# Patient Record
Sex: MALE | Race: WHITE | Employment: OTHER | URBAN - METROPOLITAN AREA
[De-identification: names, ages, dates, MRNs, and addresses within clinical notes are randomized per-mention and may not be internally consistent; named-entity substitution may affect disease eponyms.]

---

## 2023-02-08 ENCOUNTER — HOSPITAL ENCOUNTER (OUTPATIENT)
Dept: CARDIAC CATH/INVASIVE PROCEDURES | Age: 88
Discharge: HOME | End: 2023-02-08
Payer: MEDICARE

## 2023-02-08 VITALS
DIASTOLIC BLOOD PRESSURE: 51 MMHG | OXYGEN SATURATION: 94 % | RESPIRATION RATE: 16 BRPM | HEIGHT: 70 IN | TEMPERATURE: 98.2 F | SYSTOLIC BLOOD PRESSURE: 138 MMHG | HEART RATE: 61 BPM | WEIGHT: 152 LBS | BODY MASS INDEX: 21.76 KG/M2

## 2023-02-08 LAB
EGFR, POC: >60 ML/MIN/1.73M2
GLUCOSE BLD-MCNC: 115 MG/DL (ref 74–100)
PLATELET # BLD AUTO: 147 K/UL (ref 138–453)
POC BUN: 26 MG/DL (ref 8–26)
POC CHLORIDE: 113 MMOL/L (ref 98–107)
POC CREATININE: 1.15 MG/DL (ref 0.51–1.19)
POC HEMATOCRIT: 46 % (ref 41–53)
POC HEMOGLOBIN: 15.8 G/DL (ref 13.5–17.5)
POC POTASSIUM: 5.1 MMOL/L (ref 3.5–4.5)
POC SODIUM: 141 MMOL/L (ref 138–146)

## 2023-02-08 PROCEDURE — 85049 AUTOMATED PLATELET COUNT: CPT

## 2023-02-08 PROCEDURE — 2709999900 HC NON-CHARGEABLE SUPPLY

## 2023-02-08 PROCEDURE — 6360000004 HC RX CONTRAST MEDICATION

## 2023-02-08 PROCEDURE — 84295 ASSAY OF SERUM SODIUM: CPT

## 2023-02-08 PROCEDURE — 2580000003 HC RX 258: Performed by: INTERNAL MEDICINE

## 2023-02-08 PROCEDURE — 36415 COLL VENOUS BLD VENIPUNCTURE: CPT

## 2023-02-08 PROCEDURE — 7100000010 HC PHASE II RECOVERY - FIRST 15 MIN

## 2023-02-08 PROCEDURE — 82435 ASSAY OF BLOOD CHLORIDE: CPT

## 2023-02-08 PROCEDURE — 7100000011 HC PHASE II RECOVERY - ADDTL 15 MIN

## 2023-02-08 PROCEDURE — C1894 INTRO/SHEATH, NON-LASER: HCPCS

## 2023-02-08 PROCEDURE — 84520 ASSAY OF UREA NITROGEN: CPT

## 2023-02-08 PROCEDURE — 82565 ASSAY OF CREATININE: CPT

## 2023-02-08 PROCEDURE — 84132 ASSAY OF SERUM POTASSIUM: CPT

## 2023-02-08 PROCEDURE — 82947 ASSAY GLUCOSE BLOOD QUANT: CPT

## 2023-02-08 PROCEDURE — 85014 HEMATOCRIT: CPT

## 2023-02-08 PROCEDURE — 6360000002 HC RX W HCPCS

## 2023-02-08 RX ORDER — M-VIT,TX,IRON,MINS/CALC/FOLIC 27MG-0.4MG
1 TABLET ORAL DAILY
COMMUNITY

## 2023-02-08 RX ORDER — LISINOPRIL 20 MG/1
20 TABLET ORAL 2 TIMES DAILY
COMMUNITY

## 2023-02-08 RX ORDER — SODIUM CHLORIDE 9 MG/ML
INJECTION, SOLUTION INTRAVENOUS CONTINUOUS
Status: DISCONTINUED | OUTPATIENT
Start: 2023-02-08 | End: 2023-02-09 | Stop reason: HOSPADM

## 2023-02-08 RX ORDER — ATORVASTATIN CALCIUM 10 MG/1
10 TABLET, FILM COATED ORAL NIGHTLY
COMMUNITY

## 2023-02-08 RX ADMIN — SODIUM CHLORIDE: 9 INJECTION, SOLUTION INTRAVENOUS at 13:54

## 2023-02-08 NOTE — PROGRESS NOTES
Patient admitted, consent signed and questions answered. Patient ready for procedure. Call light to reach with side rails up 2 of 2. Bilat groins clipped with writer and Armida present. family at bedside with patient. History and physical completed.

## 2023-02-08 NOTE — PROGRESS NOTES
All discharge instructions reviewed, questions answered, paper signed and given copy. Patient discharged per RN Gianfranco Dobbs with family and belongings. No questions or concerns.

## 2023-02-08 NOTE — DISCHARGE INSTRUCTIONS
TRANSESOPHAGEAL ECHOCARDIOGRAM / BEBE DISCHARGE INSTRUCTIONS    If the following occurs call your physician or seek help    -trouble with swallowing    -spitting or coughing up blood    -severe pain in the throat region / your throat can be sore for several days but pain should not increase as days continue    Do not drive or operate heavy machinery today due to sedation            Transesophageal Echocardiogram:  What is a transesophageal echocardiogram?     A transesophageal echocardiogram is a test to help your doctor look at the inside of your heart. A small device called a transducer directs sound waves toward your heart. The sound waves make a picture of the heart's valves and chambers. Your doctor may do this test to look for certain types of heart disease. Or it may be done to see how disease is affecting your heart. You will be given medicine to make you sleepy and comfortable during the test.  The doctor puts a small, flexible tube into your throat and guides it to the esophagus. This is the tube that connects your mouth to your stomach. The doctor will ask you to swallow as the tube goes down. The transducer is at the tip of the tube. It gets close to your heart to make clear pictures. The doctor will look at the ultrasound pictures on a screen. You will not be able to eat or drink until the numbness from the throat spray wears off. Your throat may be sore for a few days after the test.  Follow-up care is a key part of your treatment and safety. Be sure to make and go to all appointments, and call your doctor if you are having problems. It's also a good idea to know your test results and keep a list of your current medications. Going home  Be sure you have someone to drive you home. Anesthesia and pain medicine make it unsafe for you to drive. Where can you learn more? Go to [email or web address] org and sign in to your [employer] account.  Enter K500 in the [address] Information box to learn more about Transesophageal Echocardiogram: Before Your Procedure.     If you do not have an account, please click on the Sign Up Now link. © 7851-2345 Healthwise, Incorporated. Care instructions adapted under license by [facility] ([facility]). This care instruction is for use with your licensed healthcare professional. If you have questions about a medical condition or this instruction, always ask your healthcare professional. [address] any warranty or liability for your use of this information.   Content Version: 00.1.380402; Current as of: February 20, 2015

## 2023-02-08 NOTE — PROGRESS NOTES
Taking fluids well with some soreness to throat. Ambulates with some vertigo. Back to bed with IV fluids continuing. Vitals stable.

## 2023-02-08 NOTE — PROGRESS NOTES
Patient had multiple attempts for BEBE, but was unable to tolerate procedure. Will need to postpone case until able to schedule under general anesthesia as per Dr Marion Mortimer.     Allison Tomlinson  [facility] Fellow

## 2023-02-08 NOTE — PROGRESS NOTES
Pt returned to room after BEBE procedure was unable to be completed d/t difficult anatomy and inability to pass the tube. VSS, Dr. Marcelo Alvarado in to talk to family. Pt to return to have procedures completed, office will call to schedule.

## 2023-02-08 NOTE — H&P
[facility] Cardiology Cardiology   History & Physical               Today's Date: 2/8/2023  Patient Name: Marko Preciado  Date of admission: No admission date for patient encounter. Patient's age: 81 y.o., 1935  Admission Dx: No admission diagnoses are documented for this encounter. Reason for Admission:  Valvular heart disease    CHIEF COMPLAINT:      History Obtained From:  patient, electronic medical record    HISTORY OF PRESENT ILLNESS:      The patient is a 81 y.o. presenting for scheduled elective coronary angiography to evaluate severity of mitral regurgitation and aortic valve disease. Patient underwent stress test which was noted to be abnormal.     Past Medical History:   has a past medical history of Aortic valve insufficiency, Arthritis, and HTN (hypertension). Past Surgical History:   has a past surgical history that includes Colonoscopy; back surgery; Aortic valve replacement (5/19/11); hernia repair; and Prostate surgery. Home Medications:    Prior to Admission medications    Medication Sig Start Date End Date Taking? Authorizing Provider   aspirin 81 MG tablet Take 81 mg by mouth daily. Historical Provider, MD   vitamin B-12 (CYANOCOBALAMIN) 1000 MCG tablet Take 2,000 mcg by mouth daily. Historical Provider, MD   Omega-3 Fatty Acids (FISH OIL) 1000 MG CAPS Take 1,000 mg by mouth daily. Historical Provider, MD   acetaminophen (TYLENOL) 325 MG tablet Take 650 mg by mouth every 6 hours as needed. Historical Provider, MD        No current facility-administered medications for this encounter. Allergies:  Codeine, Penicillins, and Sulfa antibiotics    Social History:   reports that he has never smoked. He does not have any smokeless tobacco history on file. Family History: family history includes Cancer in his sister; Diabetes in his sister; Heart Attack in his father; Hypertension in his mother.     REVIEW OF SYSTEMS:      Constitutional: there has been no unanticipated weight loss. Eyes: No visual changes or diplopia. ENT: No Headaches  Cardiovascular:  Remaining as above  Respiratory: No cough  Gastrointestinal: No abdominal pain. No change in bowel or bladder habits. Genitourinary: No dysuria, trouble voiding, or hematuria. Neurological: No headache. PHYSICAL EXAM:      There were no vitals taken for this visit. No intake or output data in the 24 hours ending 02/08/23 0906        Constitutional and General Appearance:   alert, cooperative, no distress and appears stated age  HEENT:  PERRL, EOMI  Respiratory:  Normal excursion and expansion without use of accessory muscles  Resp Auscultation:  Good respiratory effort. No for increased work of breathing. On auscultation: clear to auscultation bilaterally  Cardiovascular:  Regular rate and rhythm  S1/S2  + murmur  The apical impulse is not displaced  Abdomen:  Soft  Bowel sounds present  Non-tender to palpation  Extremities:  No cyanosis or clubbing  Lower extremity edema: No  Skin:  Warm and dry  Neurological:  Alert and oriented. Moves all extremities well    Labs:     CBC: No results for input(s): WBC, HGB, HCT, PLT in the last 72 hours. BMP: No results for input(s): NA, K, CO2, BUN, CREATININE, LABGLOM, GLUCOSE in the last 72 hours. Pro-BNP:  No results for input(s): PROBNP in the last 72 hours. BNP: No results for input(s): BNP in the last 72 hours. PT/INR: No results for input(s): PROTIME, INR in the last 72 hours. APTT:No results for input(s): APTT in the last 72 hours. CARDIAC ENZYMES:No results for input(s): CKTOTAL, CKMB, CKMBINDEX, TROPONINI in the last 72 hours. Invalid input(s):  TROPONINT  No results for input(s): TROPONINT in the last 72 hours. FASTING LIPID PANEL:No results found for: HDL, LDLDIRECT, LDLCALC, TRIG  LIVER PROFILE:No results for input(s): AST, ALT, LABALBU in the last 72 hours. Patient's Active Problem List  Active Problems:    * No active hospital problems. *  Resolved Problems:    * No resolved hospital problems. *      DATA  ECHO  9/2/22  Left Ventricle : The left ventricular systolic function is normal.      The left ventricular ejection fraction is within the normal range. LVEF is 66%. Aortic Valve : Bioprosthetic (Bovine) aortic valve is seen in good      position. Peak gradient 45mmHg. Mean gradient 23mmHg. There is mild      to moderate aortic regurgitation. Mild aortic stenosis. Mitral Valve : Mitral regurgitation is moderate to severe. Multiple      jets noted. EOA is 0.29cm2, MR radius is 0.8cm, MR volume is 53ml,      vena contracta is 0.73cm, mean gradient is 1mmHg. Pulmonic Valve : There is mild pulmonic valvular regurgitation. Holter  2/9/2021   This is a normal Holter recording for patient's age. Normal sinus rhythm is the predominant rhythm during the recording      period. No significant arrhythmias when patient reported symptoms. No significant arrhythmias noted. No pauses noted. No malignant arrhythmias       STRESS  04/2022   No ischemic ECG changes      Bradycardia with PVCs throughout test      Inferoapical infarct with tom-infarct ischemia       IMPRESSION:    Abnormal Stress test  Mitral regurge; Moderate to severe  Moderate AI    RECOMMENDATIONS:  Proceed with planned procedure  Further recommendations to follow      Discussed with patient and Nurse. Ally Morel MD, M.D. Fellow, [address]      Please note that part of this chart were generated using voice recognition  dictation software. Although every effort was made to ensure the accuracy of this automated transcription, some errors in transcription may have occurred.

## 2023-02-21 ENCOUNTER — ANESTHESIA EVENT (OUTPATIENT)
Dept: CARDIAC CATH/INVASIVE PROCEDURES | Age: 88
End: 2023-02-21

## 2023-02-21 ENCOUNTER — ANESTHESIA (OUTPATIENT)
Dept: CARDIAC CATH/INVASIVE PROCEDURES | Age: 88
End: 2023-02-21

## 2023-02-21 ENCOUNTER — HOSPITAL ENCOUNTER (INPATIENT)
Dept: CARDIAC CATH/INVASIVE PROCEDURES | Age: 88
LOS: 1 days | Discharge: HOME HEALTH | DRG: 247 | End: 2023-02-22
Attending: INTERNAL MEDICINE | Admitting: INTERNAL MEDICINE
Payer: MEDICARE

## 2023-02-21 PROBLEM — Z98.890 STATUS POST CARDIAC CATHETERIZATION: Status: ACTIVE | Noted: 2023-02-21

## 2023-02-21 PROBLEM — Z95.820 S/P ANGIOPLASTY WITH STENT: Status: ACTIVE | Noted: 2023-02-21

## 2023-02-21 LAB
ACTIVATED CLOTTING TIME: 224 SEC (ref 79–149)
EGFR, POC: >60 ML/MIN/1.73M2
GLUCOSE BLD-MCNC: 103 MG/DL (ref 74–100)
PLATELET # BLD AUTO: 181 K/UL (ref 138–453)
POC BUN: 23 MG/DL (ref 8–26)
POC CHLORIDE: 107 MMOL/L (ref 98–107)
POC CREATININE: 1.14 MG/DL (ref 0.51–1.19)
POC HEMATOCRIT: 47 % (ref 41–53)
POC HEMOGLOBIN: 15.9 G/DL (ref 13.5–17.5)
POC IONIZED CALCIUM: 1.27 MMOL/L (ref 1.15–1.33)
POC POTASSIUM: 4.8 MMOL/L (ref 3.5–4.5)
POC SODIUM: 145 MMOL/L (ref 138–146)

## 2023-02-21 PROCEDURE — 84132 ASSAY OF SERUM POTASSIUM: CPT

## 2023-02-21 PROCEDURE — 99152 MOD SED SAME PHYS/QHP 5/>YRS: CPT

## 2023-02-21 PROCEDURE — 4A023N8 MEASUREMENT OF CARDIAC SAMPLING AND PRESSURE, BILATERAL, PERCUTANEOUS APPROACH: ICD-10-PCS | Performed by: INTERNAL MEDICINE

## 2023-02-21 PROCEDURE — C1874 STENT, COATED/COV W/DEL SYS: HCPCS

## 2023-02-21 PROCEDURE — 85049 AUTOMATED PLATELET COUNT: CPT

## 2023-02-21 PROCEDURE — 2060000000 HC ICU INTERMEDIATE R&B

## 2023-02-21 PROCEDURE — B2151ZZ FLUOROSCOPY OF LEFT HEART USING LOW OSMOLAR CONTRAST: ICD-10-PCS | Performed by: INTERNAL MEDICINE

## 2023-02-21 PROCEDURE — 85347 COAGULATION TIME ACTIVATED: CPT

## 2023-02-21 PROCEDURE — 82947 ASSAY GLUCOSE BLOOD QUANT: CPT

## 2023-02-21 PROCEDURE — B24BZZ4 ULTRASONOGRAPHY OF HEART WITH AORTA, TRANSESOPHAGEAL: ICD-10-PCS | Performed by: INTERNAL MEDICINE

## 2023-02-21 PROCEDURE — C1892 INTRO/SHEATH,FIXED,PEEL-AWAY: HCPCS

## 2023-02-21 PROCEDURE — 6360000004 HC RX CONTRAST MEDICATION

## 2023-02-21 PROCEDURE — C1769 GUIDE WIRE: HCPCS

## 2023-02-21 PROCEDURE — 93460 R&L HRT ART/VENTRICLE ANGIO: CPT

## 2023-02-21 PROCEDURE — C1760 CLOSURE DEV, VASC: HCPCS

## 2023-02-21 PROCEDURE — 82565 ASSAY OF CREATININE: CPT

## 2023-02-21 PROCEDURE — 82330 ASSAY OF CALCIUM: CPT

## 2023-02-21 PROCEDURE — C1887 CATHETER, GUIDING: HCPCS

## 2023-02-21 PROCEDURE — 6370000000 HC RX 637 (ALT 250 FOR IP)

## 2023-02-21 PROCEDURE — 7100000011 HC PHASE II RECOVERY - ADDTL 15 MIN

## 2023-02-21 PROCEDURE — 6360000002 HC RX W HCPCS

## 2023-02-21 PROCEDURE — 2580000003 HC RX 258: Performed by: STUDENT IN AN ORGANIZED HEALTH CARE EDUCATION/TRAINING PROGRAM

## 2023-02-21 PROCEDURE — 6370000000 HC RX 637 (ALT 250 FOR IP): Performed by: STUDENT IN AN ORGANIZED HEALTH CARE EDUCATION/TRAINING PROGRAM

## 2023-02-21 PROCEDURE — B2111ZZ FLUOROSCOPY OF MULTIPLE CORONARY ARTERIES USING LOW OSMOLAR CONTRAST: ICD-10-PCS | Performed by: INTERNAL MEDICINE

## 2023-02-21 PROCEDURE — C1894 INTRO/SHEATH, NON-LASER: HCPCS

## 2023-02-21 PROCEDURE — 027034Z DILATION OF CORONARY ARTERY, ONE ARTERY WITH DRUG-ELUTING INTRALUMINAL DEVICE, PERCUTANEOUS APPROACH: ICD-10-PCS | Performed by: INTERNAL MEDICINE

## 2023-02-21 PROCEDURE — 99153 MOD SED SAME PHYS/QHP EA: CPT

## 2023-02-21 PROCEDURE — 84520 ASSAY OF UREA NITROGEN: CPT

## 2023-02-21 PROCEDURE — 7100000010 HC PHASE II RECOVERY - FIRST 15 MIN

## 2023-02-21 PROCEDURE — 85014 HEMATOCRIT: CPT

## 2023-02-21 PROCEDURE — 93325 DOPPLER ECHO COLOR FLOW MAPG: CPT

## 2023-02-21 PROCEDURE — C1751 CATH, INF, PER/CENT/MIDLINE: HCPCS

## 2023-02-21 PROCEDURE — 82435 ASSAY OF BLOOD CHLORIDE: CPT

## 2023-02-21 PROCEDURE — 2709999900 HC NON-CHARGEABLE SUPPLY

## 2023-02-21 PROCEDURE — C9600 PERC DRUG-EL COR STENT SING: HCPCS

## 2023-02-21 PROCEDURE — 93312 ECHO TRANSESOPHAGEAL: CPT

## 2023-02-21 PROCEDURE — 84295 ASSAY OF SERUM SODIUM: CPT

## 2023-02-21 RX ORDER — ATORVASTATIN CALCIUM 40 MG/1
40 TABLET, FILM COATED ORAL NIGHTLY
Status: DISCONTINUED | OUTPATIENT
Start: 2023-02-21 | End: 2023-02-22 | Stop reason: HOSPADM

## 2023-02-21 RX ORDER — CLOPIDOGREL BISULFATE 75 MG/1
75 TABLET ORAL DAILY
Qty: 30 TABLET | Refills: 3 | Status: SHIPPED | OUTPATIENT
Start: 2023-02-21

## 2023-02-21 RX ORDER — SODIUM CHLORIDE 0.9 % (FLUSH) 0.9 %
5-40 SYRINGE (ML) INJECTION PRN
Status: DISCONTINUED | OUTPATIENT
Start: 2023-02-21 | End: 2023-02-22 | Stop reason: HOSPADM

## 2023-02-21 RX ORDER — SODIUM CHLORIDE 0.9 % (FLUSH) 0.9 %
10 SYRINGE (ML) INJECTION EVERY 12 HOURS SCHEDULED
Status: DISCONTINUED | OUTPATIENT
Start: 2023-02-21 | End: 2023-02-22 | Stop reason: HOSPADM

## 2023-02-21 RX ORDER — POLYETHYLENE GLYCOL 3350 17 G/17G
17 POWDER, FOR SOLUTION ORAL DAILY PRN
Status: DISCONTINUED | OUTPATIENT
Start: 2023-02-21 | End: 2023-02-22 | Stop reason: HOSPADM

## 2023-02-21 RX ORDER — ACETAMINOPHEN 325 MG/1
650 TABLET ORAL EVERY 6 HOURS PRN
Status: DISCONTINUED | OUTPATIENT
Start: 2023-02-21 | End: 2023-02-22 | Stop reason: HOSPADM

## 2023-02-21 RX ORDER — ONDANSETRON 2 MG/ML
4 INJECTION INTRAMUSCULAR; INTRAVENOUS EVERY 6 HOURS PRN
Status: DISCONTINUED | OUTPATIENT
Start: 2023-02-21 | End: 2023-02-22 | Stop reason: HOSPADM

## 2023-02-21 RX ORDER — CLOPIDOGREL BISULFATE 75 MG/1
75 TABLET ORAL DAILY
Status: DISCONTINUED | OUTPATIENT
Start: 2023-02-21 | End: 2023-02-22 | Stop reason: HOSPADM

## 2023-02-21 RX ORDER — HYDRALAZINE HYDROCHLORIDE 20 MG/ML
10 INJECTION INTRAMUSCULAR; INTRAVENOUS EVERY 6 HOURS PRN
Status: DISCONTINUED | OUTPATIENT
Start: 2023-02-21 | End: 2023-02-22 | Stop reason: HOSPADM

## 2023-02-21 RX ORDER — SODIUM CHLORIDE 0.9 % (FLUSH) 0.9 %
10 SYRINGE (ML) INJECTION PRN
Status: DISCONTINUED | OUTPATIENT
Start: 2023-02-21 | End: 2023-02-22 | Stop reason: HOSPADM

## 2023-02-21 RX ORDER — SODIUM CHLORIDE 9 MG/ML
INJECTION, SOLUTION INTRAVENOUS PRN
Status: DISCONTINUED | OUTPATIENT
Start: 2023-02-21 | End: 2023-02-22 | Stop reason: HOSPADM

## 2023-02-21 RX ORDER — SODIUM CHLORIDE 9 MG/ML
INJECTION, SOLUTION INTRAVENOUS CONTINUOUS
Status: DISCONTINUED | OUTPATIENT
Start: 2023-02-21 | End: 2023-02-22 | Stop reason: HOSPADM

## 2023-02-21 RX ORDER — SODIUM CHLORIDE 0.9 % (FLUSH) 0.9 %
5-40 SYRINGE (ML) INJECTION EVERY 12 HOURS SCHEDULED
Status: DISCONTINUED | OUTPATIENT
Start: 2023-02-21 | End: 2023-02-22 | Stop reason: HOSPADM

## 2023-02-21 RX ORDER — ASPIRIN 81 MG/1
81 TABLET, CHEWABLE ORAL DAILY
Status: DISCONTINUED | OUTPATIENT
Start: 2023-02-21 | End: 2023-02-22 | Stop reason: HOSPADM

## 2023-02-21 RX ORDER — ONDANSETRON 4 MG/1
4 TABLET, ORALLY DISINTEGRATING ORAL EVERY 8 HOURS PRN
Status: DISCONTINUED | OUTPATIENT
Start: 2023-02-21 | End: 2023-02-22 | Stop reason: HOSPADM

## 2023-02-21 RX ORDER — ACETAMINOPHEN 650 MG/1
650 SUPPOSITORY RECTAL EVERY 6 HOURS PRN
Status: DISCONTINUED | OUTPATIENT
Start: 2023-02-21 | End: 2023-02-22 | Stop reason: HOSPADM

## 2023-02-21 RX ORDER — LISINOPRIL 20 MG/1
20 TABLET ORAL DAILY
Status: DISCONTINUED | OUTPATIENT
Start: 2023-02-21 | End: 2023-02-22 | Stop reason: HOSPADM

## 2023-02-21 RX ADMIN — SODIUM CHLORIDE: 9 INJECTION, SOLUTION INTRAVENOUS at 09:53

## 2023-02-21 RX ADMIN — SODIUM CHLORIDE, PRESERVATIVE FREE 10 ML: 5 INJECTION INTRAVENOUS at 21:14

## 2023-02-21 RX ADMIN — ATORVASTATIN CALCIUM 40 MG: 80 TABLET, FILM COATED ORAL at 21:13

## 2023-02-21 NOTE — PROGRESS NOTES
Patient admitted, consent signed and questions answered. Patient ready for procedure. Call light to reach with side rails up 2 of 2. Bilateral groin clipped with writer and Armida present. Family at bedside with patient. History and physical needs to be completed. Cathy Puga NSTEMI

## 2023-02-21 NOTE — ANESTHESIA PRE PROCEDURE
Department of Anesthesiology  Preprocedure Note       Name:  Baltazar Hernandez   Age:  81 y.o.  :  1935                                          MRN:  [id number]         Date:  2023      Surgeon: * Surgery not found *    Procedure:     Medications prior to admission:   Prior to Admission medications    Medication Sig Start Date End Date Taking? Authorizing Provider   lisinopril (PRINIVIL;ZESTRIL) 20 MG tablet Take 20 mg by mouth in the morning and at bedtime    Historical Provider, MD   atorvastatin (LIPITOR) 10 MG tablet Take 10 mg by mouth at bedtime    Historical Provider, MD   Cholecalciferol (D3 PO) Take 1 capsule by mouth daily    Historical Provider, MD   Multiple Vitamins-Minerals (THERAPEUTIC MULTIVITAMIN-MINERALS) tablet Take 1 tablet by mouth daily    Historical Provider, MD   aspirin 81 MG tablet Take 81 mg by mouth every evening    Historical Provider, MD   acetaminophen (TYLENOL) 325 MG tablet Take 650 mg by mouth every 6 hours as needed. Historical Provider, MD       Current medications:    Current Outpatient Medications   Medication Sig Dispense Refill    lisinopril (PRINIVIL;ZESTRIL) 20 MG tablet Take 20 mg by mouth in the morning and at bedtime      atorvastatin (LIPITOR) 10 MG tablet Take 10 mg by mouth at bedtime      Cholecalciferol (D3 PO) Take 1 capsule by mouth daily      Multiple Vitamins-Minerals (THERAPEUTIC MULTIVITAMIN-MINERALS) tablet Take 1 tablet by mouth daily      aspirin 81 MG tablet Take 81 mg by mouth every evening      acetaminophen (TYLENOL) 325 MG tablet Take 650 mg by mouth every 6 hours as needed. Current Facility-Administered Medications   Medication Dose Route Frequency Provider Last Rate Last Admin    0.9 % sodium chloride infusion   IntraVENous Continuous Compa Coles  mL/hr at 23 0953 New Bag at 23 0953       Allergies:     Allergies   Allergen Reactions    Codeine Hives    Penicillins Hives    Sulfa Antibiotics Hives Problem List:  There is no problem list on file for this patient. Past Medical History:        Diagnosis Date    Aortic stenosis     Aortic valve insufficiency 05/09/2011    aortic valve replacement    Arthritis     BPH (benign prostatic hyperplasia)     DDD (degenerative disc disease), cervical     Dyslipidemia     H/O: stroke 2011    Heart palpitations     HTN (hypertension)        Past Surgical History:        Procedure Laterality Date    AORTIC VALVE REPLACEMENT  05/19/2011    minimally invasive 27mm medtonic mosaic  /  ROBOTIC    BACK SURGERY      X3    CARDIAC CATHETERIZATION  2011    CARDIAC CATHETERIZATION  02/21/2023    CATARACT REMOVAL WITH IMPLANT      2008 AND 2011    CHOLECYSTECTOMY  2021    COLONOSCOPY      EYE SURGERY      HERNIA REPAIR      1990 / 1996 / 1999   [address] Left 07/29/2013    PROSTATE SURGERY      TRANSESOPHAGEAL ECHOCARDIOGRAM  02/08/2023    TRANSESOPHAGEAL ECHOCARDIOGRAM  02/21/2023       Social History:    Social History     Tobacco Use    Smoking status: Never    Smokeless tobacco: Not on file   Substance Use Topics    Alcohol use: Not Currently                                Counseling given: Not Answered      Vital Signs (Current):   Vitals:    02/21/23 0947 02/21/23 0954   BP: (!) 117/54    Pulse: 57 59   Resp: 16    Temp: 97.4 °F (36.3 °C)    TempSrc: Oral    SpO2: 97%    Weight: 155 lb (70.3 kg)    Height: 5' 10\" (1.778 m)                                               BP Readings from Last 3 Encounters:   02/21/23 (!) 117/54   02/08/23 (!) 138/51       NPO Status:                                                                                 BMI:   Wt Readings from Last 3 Encounters:   02/21/23 155 lb (70.3 kg)   02/08/23 152 lb (68.9 kg)     Body mass index is 22.24 kg/m².     CBC:   Lab Results   Component Value Date/Time     02/21/2023 09:45 AM       CMP:   Lab Results   Component Value Date/Time    CREATININE 1.14 02/21/2023 09:42 AM       POC Tests:   Recent Labs     02/21/23  0942   POCGLU 103*   POCNA 145   POCK 4.8*   POCCL 107   POCBUN 23   POCHEMO 15.9   POCHCT 47       Coags: No results found for: PROTIME, INR, APTT    HCG (If Applicable): No results found for: PREGTESTUR, PREGSERUM, HCG, HCGQUANT     ABGs: No results found for: PHART, PO2ART, CSU0NJR, YHX6GOM, BEART, C2IFMNHB     Type & Screen (If Applicable):  No results found for: LABABO, LABRH    Drug/Infectious Status (If Applicable):  No results found for: HIV, HEPCAB    COVID-19 Screening (If Applicable): No results found for: COVID19    TTE 9/2022  Left Ventricle : The left ventricular systolic function is normal.      The left ventricular ejection fraction is within the normal range.      LVEF is 66%.      Aortic Valve : Bioprosthetic (Bovine) aortic valve is seen in good      position. Peak gradient 45mmHg. Mean gradient 23mmHg.  There is mild      to moderate aortic regurgitation.  Mild aortic stenosis.      Mitral Valve : Mitral regurgitation is moderate to severe. Multiple      jets noted. EOA is 0.29cm2, MR radius is 0.8cm, MR volume is 53ml,      vena contracta is 0.73cm, mean gradient is 1mmHg.      Pulmonic Valve : There is mild pulmonic valvular regurgitation. EKG 2/09/2023   SINUS RHYTHM WITH MARKED SINUS ARRHYTHMIA      BORDERLINE ECG      COMPARED TO PREVIOUS TRACING 02/13/2022       Anesthesia Evaluation    Airway: Mallampati: II          Dental:    (+) other      Pulmonary:Negative Pulmonary ROS and normal exam                               Cardiovascular:    (+) hypertension:, valvular problems/murmurs: AS,                   Neuro/Psych:   (+) CVA:,             GI/Hepatic/Renal:             Endo/Other:    (+) : arthritis:., .                 Abdominal:             Vascular: Other Findings:           Anesthesia Plan      MAC     ASA 3       Induction: intravenous. MIPS: Postoperative opioids intended.   Anesthetic plan and risks discussed with patient. Plan discussed with CRNA.                     Susan Cortez MD   2/21/2023

## 2023-02-21 NOTE — OP NOTE
[facility]  Transesophageal Echocardiogram       Today's Date:  2/21/2023  Ordering Physician:  Dr Magdalena Rossi  Indication:   MR and AI severity    Operators:  Primary:   Dr Magdalena Rossi (Attending Physician)    Pre Procedure Conscious Sedation Data:    ASA Class:    [] I [x] II [] III [] IV    Mallampati Class:  [] I [x] II [] III [] IV    Procedure:    Patient seen and examined. History and Physical reviewed. Labs reviewed. After informed consent was obtained with explanation of the risks and benefits, the patient was brought to cardiac cath lab. All sedation was administered by the cardiologist. The oropharynx was pre-anesthesized with viscous lidocaine and cetacaine spray. The ultrasound probe was passed without any difficulty. BEBE findings:    LA:  Normal  LOGAN:  No thrombus  RA:  Normal  RV:   Normal  LV:  Normal  Estimated LVEF:  55%  Aorta:   Mild atheromatous disease arch  Pericardium: No pericardial effusion  Septum:  No intracardiac shunt via color Doppler. Valves:    Mitral Valve: Structurally normal. Moderate to severe regurgitation is identified. Aortic Valve: The aortic valve is trileaflet and sclerotic with aortic sclerosis. BERNARD by planimetry 0.94 cm2. Mild regurgitiation is identified. Tricuspid valve: Structurally normal. Trivial regurgitation is identified. Pulmonary valve: Normal. No significant regurgitation    No valvular vegetations or thrombus identified. Summary:     1. A BEBE was performed without complications. 2. LVEF 55%. 3. The aortic valve is tri-leaflet and sclerotic with aortic sclerosis. BERNARD by planimetry 0.94 cm2. Mild AI. 4. Moderate to severe MR.  5. No thrombus or valvular vegetation identified  6. There were no complications encountered. 7. Proceed with right and left sided cardiac catheterization.      French Amin MD       Cardiovascular Fellow

## 2023-02-21 NOTE — OP NOTE
[facility]    CARDIAC CATHETERIZATION    Date:   2/21/2023  Patient name:  Thomas Colin  Date of admission:  2/21/2023  9:12 AM  MRN:   [id number]  YOB: 1935    Operators:  Primary:   Loi Morgan MD (Attending Physician)    Assistant/CV fellow:  Salinas Olivera MD      Procedure performed:     [x] Left Heart Catheterization. [] Graft Angiography. [x] Left Ventriculography. [x] Right Heart Catheterization. [x] Coronary Angiography. [] Aortic Valve Studies. [] PCI:      [] Other:       Pre Procedure Conscious Sedation Data:  ASA Class:    [] I [x] II [] III [] IV    Mallampati Class:  [] I [x] II [] III [] IV      Indication:  [x] Valvular Heart disease      [] + Stress test  [] ACS      [] + EKG Changes  [] Non Q MI       [] Significant Risk Factors  [] Recurrent Angina             [] Diabetes Mellitus    [] New LBBB      [] Uncontrolled HTN. [] CHF / Low EF changes     [] Abnormal CTA / Ca Score      Procedure:  Access:  [x] Femoral Artery and Vein [] Radial  artery       [x] Right  [] Left    Procedure: After informed consent was obtained with explanation of the risks and benefits, patient was brought to the cath lab. The access area was prepped and draped in sterile fashion. 1% lidocaine was used for local block. The artery was cannulated with 6  Fr sheath with brisk arterial blood return. The side port was frequently flushed and aspirated with normal saline. Findings:  LMCA: Normal     LAD: Mid 80% stenosis. The vessel is small distally   LIMA was checked and appeared not going anywhere     LCx: Large with no disease     RCA: Mid 80% stenosis       Lesion on Mid RCA:         Devices used         - Luge Wire 182 cm. Number of passes: 1.         - Geremias Florence 3.5 x 12mm. 1 inflation(s) to a max pressure of: 12     desiree. - Geremias Florence 3.5 x 12mm. 1 inflation(s) to a max pressure of: 12     desiree.         Coronary Tree        Dominance: Left       LV Analysis   LV function assessed as:Normal.   Ejection Fraction   +----------------------------------------------------------------------+---+   ! Method                                                                ! EF%! +----------------------------------------------------------------------+---+   ! LV gram                                                               !50 !   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 Recommendations:    Post stent protocol of the RCA   Monitor LAD disease with stress test and angina symptoms         RIGHT HEART CATHETERIZATION HEMODYNAMIC MEASUREMENTS      Procedure: After informed consent was obtained with explanation of the risks and benefits, the patient was brought to the cath lab. The right groin was prepped and draped in sterile fashion. 1% lidocaine was used for local block. Under ultrasound guidance, the right femoral vein was cannulated with 7  Fr sheath that was exchanged over a guidewire with non-pulsatile dark venous blood return noted. The side port was frequently flushed and aspirated with normal saline. Measurements were taken at the patient's phlebostatic axis.     Hemodynamic Pressures     Site S/A (mmHg) D/V (mmHg) M/ED (mmHg)   Right Atrium 9 8 6   Right Ventricle 64 0 30   Pulmonary Artery 43 0 19   PCWP 18 17 11         Oxygen Saturations    Site Oxygen Saturation (%)   Right Atrium 67.6   Pulmonary Artery Main 92.2        Cardiac Output    Calculation Method Cardiac Output (L/min) Cardiac Index (L/min/m2)   Lokesh 3. 88 2.1          Estimated Blood Loss:            [x] Minimal 10-25 cc   [] Minimal < 25 cc      [] Moderate 25-50 cc         [] >50 cc        ____________________________________________________________________    History and Risk Factors    [x] Hypertension     [] Family history of CAD  [x] Hyperlipidemia     [] Cerebrovascular Disease   [] Prior MI       [] Peripheral Vascular disease   [] Prior PCI              [] Diabetes Mellitus    [] Left Main PCI. [] Currently on Dialysis. [] Prior CABG. [] Currently smoker. [] Cardiac Arrest outside of healthcare facility. [] Yes    [x] No        Witnessed     [] Yes   [] No     Arrest after arrival of EMS  [] Yes   [] No     [] Cardiac Arrest at other Facility. [] Yes   [] No    Pre-Procedure Information. Heart Failure       [] Yes    [x] No        Class  [] I      [] II  [] III    [] IV. New Diagnosis    [] Yes  [] No    HF Type      [] Systolic   [] Diastolic          [] Unknown. Diagnostic Test:   EKG       [] Normal   [] Abnormal    New antiarrhythmia medications:    [] Yes   [] No   New onset atrial fibrillation / Flutter     [] Yes   [] No   ECG Abnormalities:      [] V. Fib   [] Rosie V. Tach           [] NS V. T   [] New LBBB           [] T. Inv  []  ST dev > 0.5 mm         [] PVC's freq  [] PVC's infrequent    Stress Test Performed:      [] Yes    [x] No     Type:     [] Stress Echo   [] Exercise Stress Test (no imaging)      [] Stress Nuclear  [] Stress Imaging     Results   [] Negative   [] Positive        [] Indeterminate  [] Unavailable     If Positive/ Risk / Extent of Ischemia:       [] Low  [] Intermediate         [] High  [] Unavailable      Cardiac CTA Performed:     [] Yes    [x] No      Results   [] CAD   [] Non obstructive CAD      [] No CAD   [] Uncertain      [] Unknown   [] Structural Disease.      Pre Procedure Medications:   [x] Yes    [] No         [x] ASA   [] Beta Blockers      [] Nitrate   [] Ca Channel Blockers      [] Ranolazine   [x] Statin       [] Plavix/Others antiplatelets      Allyson David MD       Cardiovascular Fellow

## 2023-02-22 VITALS
OXYGEN SATURATION: 90 % | BODY MASS INDEX: 22.19 KG/M2 | TEMPERATURE: 98.1 F | HEIGHT: 70 IN | DIASTOLIC BLOOD PRESSURE: 67 MMHG | SYSTOLIC BLOOD PRESSURE: 137 MMHG | HEART RATE: 79 BPM | RESPIRATION RATE: 18 BRPM | WEIGHT: 155 LBS

## 2023-02-22 LAB
ABSOLUTE EOS #: 0.22 K/UL (ref 0–0.44)
ABSOLUTE IMMATURE GRANULOCYTE: 0.03 K/UL (ref 0–0.3)
ABSOLUTE LYMPH #: 0.94 K/UL (ref 1.1–3.7)
ABSOLUTE MONO #: 0.85 K/UL (ref 0.1–1.2)
ANION GAP SERPL CALCULATED.3IONS-SCNC: 11 MMOL/L (ref 9–17)
BASOPHILS # BLD: 1 % (ref 0–2)
BASOPHILS ABSOLUTE: 0.05 K/UL (ref 0–0.2)
BUN SERPL-MCNC: 19 MG/DL (ref 8–23)
CALCIUM SERPL-MCNC: 9 MG/DL (ref 8.6–10.4)
CHLORIDE SERPL-SCNC: 106 MMOL/L (ref 98–107)
CO2 SERPL-SCNC: 22 MMOL/L (ref 20–31)
CREAT SERPL-MCNC: 1.18 MG/DL (ref 0.7–1.2)
EOSINOPHILS RELATIVE PERCENT: 2 % (ref 1–4)
GFR SERPL CREATININE-BSD FRML MDRD: >60 ML/MIN/1.73M2
GLUCOSE SERPL-MCNC: 116 MG/DL (ref 70–99)
HCT VFR BLD AUTO: 41.1 % (ref 40.7–50.3)
HGB BLD-MCNC: 13.6 G/DL (ref 13–17)
IMMATURE GRANULOCYTES: 0 %
LYMPHOCYTES # BLD: 10 % (ref 24–43)
MCH RBC QN AUTO: 33.7 PG (ref 25.2–33.5)
MCHC RBC AUTO-ENTMCNC: 33.1 G/DL (ref 28.4–34.8)
MCV RBC AUTO: 102 FL (ref 82.6–102.9)
MONOCYTES # BLD: 9 % (ref 3–12)
NRBC AUTOMATED: 0 PER 100 WBC
PDW BLD-RTO: 13.1 % (ref 11.8–14.4)
PLATELET # BLD AUTO: 167 K/UL (ref 138–453)
PMV BLD AUTO: 10.1 FL (ref 8.1–13.5)
POTASSIUM SERPL-SCNC: 5.4 MMOL/L (ref 3.7–5.3)
RBC # BLD: 4.03 M/UL (ref 4.21–5.77)
SEG NEUTROPHILS: 78 % (ref 36–65)
SEGMENTED NEUTROPHILS ABSOLUTE COUNT: 7.57 K/UL (ref 1.5–8.1)
SODIUM SERPL-SCNC: 139 MMOL/L (ref 135–144)
WBC # BLD AUTO: 9.7 K/UL (ref 3.5–11.3)

## 2023-02-22 PROCEDURE — 97116 GAIT TRAINING THERAPY: CPT

## 2023-02-22 PROCEDURE — 6370000000 HC RX 637 (ALT 250 FOR IP): Performed by: STUDENT IN AN ORGANIZED HEALTH CARE EDUCATION/TRAINING PROGRAM

## 2023-02-22 PROCEDURE — 97162 PT EVAL MOD COMPLEX 30 MIN: CPT

## 2023-02-22 PROCEDURE — 80048 BASIC METABOLIC PNL TOTAL CA: CPT

## 2023-02-22 PROCEDURE — 2580000003 HC RX 258: Performed by: STUDENT IN AN ORGANIZED HEALTH CARE EDUCATION/TRAINING PROGRAM

## 2023-02-22 PROCEDURE — 36415 COLL VENOUS BLD VENIPUNCTURE: CPT

## 2023-02-22 PROCEDURE — 97530 THERAPEUTIC ACTIVITIES: CPT

## 2023-02-22 PROCEDURE — 85025 COMPLETE CBC W/AUTO DIFF WBC: CPT

## 2023-02-22 RX ORDER — NITROGLYCERIN 0.4 MG/1
0.4 TABLET SUBLINGUAL EVERY 5 MIN PRN
Qty: 25 TABLET | Refills: 3 | Status: SHIPPED | OUTPATIENT
Start: 2023-02-22

## 2023-02-22 RX ORDER — ATORVASTATIN CALCIUM 40 MG/1
40 TABLET, FILM COATED ORAL NIGHTLY
Qty: 30 TABLET | Refills: 3 | Status: SHIPPED | OUTPATIENT
Start: 2023-02-22

## 2023-02-22 RX ADMIN — LISINOPRIL 20 MG: 20 TABLET ORAL at 08:41

## 2023-02-22 RX ADMIN — SODIUM CHLORIDE, PRESERVATIVE FREE 10 ML: 5 INJECTION INTRAVENOUS at 08:42

## 2023-02-22 RX ADMIN — ASPIRIN 81 MG: 81 TABLET, CHEWABLE ORAL at 08:41

## 2023-02-22 RX ADMIN — CLOPIDOGREL 75 MG: 75 TABLET, FILM COATED ORAL at 08:41

## 2023-02-22 NOTE — PROGRESS NOTES
Physical Therapy  Facility/Department: [facility] CAR 2- STEPDOWN  Physical Therapy Initial Assessment    Name: Maldonado Orr  : 1935  MRN: [id number]  Date of Service: 2023    The patient was admitted for: elective coronary angiography to evaluate severity of mitral regurgitation and aortic valve disease  Hospital Procedures: cardiac catheterization/BEBE    Discharge Recommendations:  Patient would benefit from continued therapy after discharge   PT Equipment Recommendations  Equipment Needed: Yes  Mobility Devices: Marcus Leaver: Rolling  Other: pt is a fall risk with recent inactivity and noted decline in high level balance      Patient Diagnosis(es): There were no encounter diagnoses. Past Medical History:  has a past medical history of Aortic stenosis, Aortic valve insufficiency, Arthritis, BPH (benign prostatic hyperplasia), DDD (degenerative disc disease), cervical, Dyslipidemia, H/O: stroke, Heart palpitations, and HTN (hypertension). Past Surgical History:  has a past surgical history that includes Colonoscopy; back surgery; Aortic valve replacement (2011); hernia repair; Prostate surgery; Cataract removal with implant; joint replacement (Left, 2013); Cholecystectomy (); Cardiac catheterization (); transesophageal echocardiogram (2023); eye surgery; Cardiac catheterization (2023); and transesophageal echocardiogram (2023). Assessment   Body Structures, Functions, Activity Limitations Requiring Skilled Therapeutic Intervention: Decreased functional mobility ; Decreased balance  Assessment: Pt presents with generalized weakness and deconditioning with recent inactivity. Pt require CGA with ambulating  for safet as pt states he feel unsteady upon initial standing from sitting. Pt educated and introduced to rolling walker for stability and safety with gait.  Pt will benefit from continued PT to progress activity and promote functional independence with gait needed to regain prior level of independence  Therapy Prognosis: Good  Decision Making: Medium Complexity  Clinical Presentation: evolving  Requires PT Follow-Up: Yes  Activity Tolerance  Activity Tolerance: Patient tolerated evaluation without incident;Patient limited by endurance; Patient limited by fatigue     Plan   Physcial Therapy Plan  General Plan:  (5-6x/wk)  Current Treatment Recommendations: Strengthening, Balance training, Functional mobility training, Transfer training, Gait training, Stair training, Equipment evaluation, education, & procurement, Therapeutic activities, Neuromuscular re-education  Safety Devices  Type of Devices: Call light within reach, Left in chair, Gait belt, Chair alarm in place, Nurse notified  Restraints  Restraints Initially in Place: No     Restrictions  Restrictions/Precautions  Restrictions/Precautions: Up as Tolerated  Required Braces or Orthoses?: No  Position Activity Restriction  Other position/activity restrictions: up with assist     Subjective   General  Chart Reviewed: Yes  Patient assessed for rehabilitation services?: Yes  Additional Pertinent Hx: A BEBE was performed without complications. LVEF 55%. ,aortic valve is tri-leaflet and sclerotic with aortic sclerosis,Moderate to severe MR.   Response To Previous Treatment: Not applicable  Family / Caregiver Present: No  Follows Commands: Within Functional Limits  Other (Comment): Pt awake and alert in agreement with PT intervention, Okay per RN to see  General Comment  Comments: Pt report indepedent at base line without AD  Subjective  Subjective: Pt report generalized overall pain, chronic in nature at dolly and LE's, able to continue to PT assessment         Social/Functional History  Social/Functional History  Lives With: Spouse  Type of Home: House  Home Layout: Two level  Home Access: Stairs to enter with rails  Entrance Stairs - Number of Steps: 3 [place]  Entrance Stairs - Rails: None  Bathroom Shower/Tub: Tub/Shower unit  Bathroom Toilet: Standard  Bathroom Equipment: Grab bars in shower  Bathroom Accessibility: Accessible  Has the patient had two or more falls in the past year or any fall with injury in the past year?: No  Receives Help From: Family  ADL Assistance: Independent  Homemaking Assistance: Independent  Homemaking Responsibilities: Yes  Meal Prep Responsibility: Secondary  Laundry Responsibility: Secondary  Cleaning Responsibility: Secondary  Bill Paying/Finance Responsibility: Primary  Shopping Responsibility: Primary  Dependent Care Responsibility: No  Health Care Management: Secondary  Other (Comment): Pt report house hold task are shared and pt is independent with self care  Ambulation Assistance: Independent  Transfer Assistance: Independent  Active : Yes  Mode of Transportation: Car  Occupation: Retired  Type of Occupation: farmer, occasionally work on farm  Leisure & Hobbies: working on farm  Additional Comments: Pt report independent at baseline without walker  [address]: Impaired  Vision Exceptions: Wears glasses for distance  Tracking: Able to track stimulus in all quads w/o difficulty  Hearing  Hearing: Exceptions to [facility]  Hearing Exceptions: No hearing aid (need increased volume)    Cognition   Orientation  Overall Orientation Status: Within Normal Limits  Orientation Level: Oriented X4  Cognition  Overall Cognitive Status: Exceptions  Arousal/Alertness: Delayed responses to stimuli  Following Commands: Follows all commands without difficulty  Attention Span: Attends with cues to redirect  Memory: Appears intact  Safety Judgement: Good awareness of safety precautions  Problem Solving: Able to problem solve independently  Insights: Fully aware of deficits  Initiation: Requires cues for some  Sequencing: Requires cues for some  Cognition Comment: Pleasemt and cooperative able to make needs known, demonstrates understanding of fall risk.  Pt in agreement with POC     Objective Heart Rate: 79  Heart Rate Source: Monitor  BP: 137/67  BP Location: Left upper arm  BP Method: Automatic  MAP (Calculated): 90  Resp: 18  SpO2: 90 %  O2 Device: None (Room air)     Observation/Palpation  Posture: Good  Observation: Pt able to sit/stand with erect posture  Gross Assessment  AROM: Within functional limits  PROM: Within functional limits  Strength: Within functional limits  Coordination: Within functional limits  Tone: Normal  Sensation: Intact     AROM RLE (degrees)  RLE AROM: WFL  AROM LLE (degrees)  LLE AROM : WFL  AROM RUE (degrees)  RUE AROM : WFL  AROM LUE (degrees)  LUE AROM : WFL  Strength RLE  Strength RLE: WFL  Strength LLE  Strength LLE: WFL  Strength RUE  Strength RUE: WFL  Strength LUE  Strength LUE: WFL  Strength Other  Other: Pt presents with general weakness and deconditioning           Bed mobility  Bridging: Supervision  Rolling to Left: Supervision  Rolling to Right: Supervision  Supine to Sit: Supervision  Sit to Supine: Supervision  Scooting: Supervision  Bed Mobility Comments: activity require extra time and effort with external support and use of railing  Transfers  Sit to Stand: Contact guard assistance  Stand to Sit: Contact guard assistance  Bed to Chair: Contact guard assistance  Comment: CGA for safety needed as pt states he feel unsteady. Pt educated and introduced to rolling walker for stability and safety with gait.   Ambulation  Surface: Level tile  Device: Rolling Walker  Assistance: Contact guard assistance  Quality of Gait: slow guarded gait with unsteadiness and pt seeking external support without AD, improved ability with walker  Gait Deviations: Slow Diane;Decreased step length  Distance: 60 ft CGA RW  Comments: Pt demonstrates fall risk with recommendation to use walker with gait and transfers pending improved general strength and activity  More Ambulation?: No  Stairs/Curb  Stairs?: No     Balance  Posture: Good  Sitting - Static: Good  Sitting - Dynamic: Good  Standing - Static: Good  Standing - Dynamic: Fair;+  Comments: standing dynamic balance assessed with rolling walker in place. Functional Reach Test  Fall Risk (Score of <10 inches is fall risk): Yes  Exercise Treatment: Pt sitting EOB x 10 min, SBA, STS CGA. Pt educated on fall risk, safety with use of walker provided practical instruction for use                                                        AM-PAC Score  AM-PAC Inpatient Mobility Raw Score : 18 (02/22/23 1252)  AM-PAC Inpatient T-Scale Score : 43.63 (02/22/23 1252)  Mobility Inpatient CMS 0-100% Score: 46.58 (02/22/23 1252)  Mobility Inpatient CMS G-Code Modifier : CK (02/22/23 1252)            Goals  Short Term Goals  Time Frame for Short Term Goals: 10 visits  Short Term Goal 1: Pt to ambulate 100 ft mod I with least AD  Short Term Goal 2: pt to demo independent bed mobility  Short Term Goal 3: Pt to ascend/descend 4 steps with right railing mod I  Patient Goals   Patient Goals : to return home       Education  Patient Education  Education Given To: Patient  Education Provided: Plan of Care;Role of Therapy;Transfer Training; Fall Prevention Strategies; Equipment  Education Provided Comments: Pt educated on fall risk, safety with use of walker provided practical instruction for use  Education Method: Demonstration  Barriers to Learning: None  Education Outcome: Verbalized understanding      Therapy Time   Individual Concurrent Group Co-treatment   Time In 0955         Time Out 1040         Minutes 45         Timed Code Treatment Minutes: 69 Guadalupe Elias, PT, DPT

## 2023-02-22 NOTE — DISCHARGE SUMMARY
[facility]  Discharge Note                 Name:  Lily Damian  YOB: 1935  Social Security Number:  [id number]  Medical Record Number:  [id number]    Date of Admission:  2/21/2023  Date of Discharge:  2/22/2023    Admitting physician: Mario Frey MD    Discharge Attending: JUANY Hayes NP, CNP  Primary Care Physician: Liza Hunt MD  Consultants: None   Discharge to home in stable condition     HOSPITAL ADMISSION PROBLEM LIST:  Patient Active Problem List   Diagnosis    S/P angioplasty with stent    Status post cardiac catheterization         Procedures:cardiac catheterization/BEBE    HOSPITAL COURSE :           The patient was admitted for: elective coronary angiography to evaluate severity of mitral regurgitation and aortic valve disease  Hospital Procedures if any: cardiac catheterization/BEBE        BEBE findings:     LA:       Normal  LOGAN:    No thrombus  RA:      Normal  RV:      Normal  LV:       Normal  Estimated LVEF:         55%  Aorta:               Mild atheromatous disease arch  Pericardium:    No pericardial effusion  Septum:           No intracardiac shunt via color Doppler. Valves:     Mitral Valve: Structurally normal. Moderate to severe regurgitation is identified. Aortic Valve: The aortic valve is trileaflet and sclerotic with aortic sclerosis. BERNARD by planimetry 0.94 cm2. Mild regurgitiation is identified. Tricuspid valve: Structurally normal. Trivial regurgitation is identified. Pulmonary valve: Normal. No significant regurgitation     No valvular vegetations or thrombus identified. Summary:      1. A BEBE was performed without complications. 2. LVEF 55%. 3. The aortic valve is tri-leaflet and sclerotic with aortic sclerosis. BERNARD by planimetry 0.94 cm2. Mild AI. 4. Moderate to severe MR.  5. No thrombus or valvular vegetation identified  6. There were no complications encountered.   7. Proceed with right and left sided cardiac catheterization. Kiera Pettit MD       Cardiovascular Fellow     CATH Findings:  LMCA: Normal     LAD: Mid 80% stenosis. The vessel is small distally   LIMA was checked and appeared not going anywhere     LCx: Large with no disease     RCA: Mid 80% stenosis       Lesion on Mid RCA:         Devices used         - Luge Wire 182 cm. Number of passes: 1.         - Geremias [place] 3.5 x 12mm. 1 inflation(s) to a max pressure of: 12     desiree. - [place] 3.5 x 12mm. 1 inflation(s) to a max pressure of: 12     desiree. Coronary Tree        Dominance: Left       LV Analysis   LV function assessed as:Normal.   Ejection Fraction   +----------------------------------------------------------------------+---+   ! Method                                                                ! EF%! +----------------------------------------------------------------------+---+   ! LV gram                                                               !50 !   +----------------------------------------------------------------------+---+      Impression:  Mid RCA stenosis 85%   Successful PTCA -CORNELIUS mid RCA 9Because of abnormal stress test inferior apical area)   Mid LAD stenosis, small size vessel (To be treated if continue angina or Sx)   Elevated right heart pressure   Aortic valve function good with mean gradient 11 mm Hg   BEBE confirmed moderate to severe mitral regurgitation, moderate aortic insufficiency, with LV function mildly reduced. Recommendations:    Post stent protocol of the RCA   Monitor LAD disease with stress test and angina symptoms          RIGHT HEART CATHETERIZATION HEMODYNAMIC MEASUREMENTS        Procedure: After informed consent was obtained with explanation of the risks and benefits, the patient was brought to the cath lab. The right groin was prepped and aped in sterile fashion. 1% lidocaine was used for local block.  Under ultrasound guidance, the right femoral vein was cannulated with 7  Fr sheath that was exchanged over a guidewire with non-pulsatile dark venous blood return noted. The side port was frequently flushed and aspirated with normal saline. Measurements were taken at the patient's phlebostatic axis. Hemodynamic Pressures                Site S/A (mmHg) D/V (mmHg) M/ED (mmHg)   Right Atrium 9 8 6   Right Ventricle 64 0 30   Pulmonary Artery 43 0 19   PCWP 18 17 11            Oxygen Saturations     Site Oxygen Saturation (%)   Right Atrium 67.6   Pulmonary Artery Main 92.2         Cardiac Output     Calculation Method Cardiac Output (L/min) Cardiac Index (L/min/m2)   Lokesh 3.88 2.1             Estimated Blood Loss:            [x] Minimal 10-25 cc   [] Minimal < 25 cc      [] Moderate 25-50 cc         [] >50 cc           ____________________________________________________________________     History and Risk Factors    [x] Hypertension                                                         [] Family history of CAD  [x] Hyperlipidemia                                                       [] Cerebrovascular Disease    [] Prior MI                                                                   [] Peripheral Vascular disease   [] Prior PCI                                                                [] Diabetes Mellitus     [] Left Main PCI. [] Currently on Dialysis. [] Prior CABG. [] Currently smoker. [] Cardiac Arrest outside of healthcare facility. [] Yes              [x] No                                           Witnessed                                [] Yes              [] No                                      Arrest after arrival of EMS      [] Yes              [] No        [] Cardiac Arrest at other Facility. [] Yes              [] No     Pre-Procedure Information.   Heart Failure [] Yes              [x] No                                                    Class               [] I          [] II              [] III                 [] IV. New Diagnosis                                    [] Yes             [] No                          HF Type                                              [] Systolic        [] Diastolic                                                                                           [] Unknown. Diagnostic Test:              EKG                                                                  [] Normal        [] Abnormal               New antiarrhythmia medications:                    [] Yes              [] No              New onset atrial fibrillation / Flutter                  [] Yes              [] No              ECG Abnormalities:                                         [] V. Fib           [] Rosie V. Tach                                                                                        [] NS V. T       [] New LBBB                                                                                        [] T.  Inv           []  ST dev > 0.5 mm                                                                                      [] PVC's freq   [] PVC's infrequent     Stress Test Performed:                                            [] Yes              [x] No       Type:                                        [] Stress Echo            [] Exercise Stress Test (no imaging)                                                  [] Stress Nuclear        [] Stress Imaging                Results                                    [] Negative                  [] Positive                                                    [] Indeterminate         [] Unavailable                 If Positive/ Risk / Extent of Ischemia:                                                  [] Low  [] Intermediate                                                             [] High            [] Unavailable                 Cardiac CTA Performed:                                          [] Yes              [x] No                   Results                         [] CAD            [] Non obstructive CAD                                                  [] No CAD       [] Uncertain                                                  [] Unknown     [] Structural Disease. Pre Procedure Medications:                                    [x] Yes              [] No                                                       [x] ASA                                    [] Beta Blockers                                                  [] Nitrate                                [] Ca Channel Blockers                                                  [] Ranolazine              [x] Statin                                                   [] Plavix/Others antiplatelets        Mingo Kam MD       Cardiovascular Fellow                 Revision History                                      Medications changes recommendation: see list   Follow Up Plan: 2 weeks       Discharge exam:   Vitals:    02/22/23 0839   BP: 137/67   Pulse: 79   Resp: 18   Temp: 98.1 °F (36.7 °C)   SpO2: 90%     Neuro: normal  Chest: Clear to ausculation. No wheezing. Cardiac: Regular rate. s1 and s2 auscultated. No murmur noted. Abdomen/groin: soft, non-tender, without masses or organomegaly, right groin no hematoma or bleeding noted  Lower extremity edema: none    Discharge Medications:     Medication List      START taking these medications     clopidogrel 75 MG tablet; Commonly known as: Plavix; Take 1 tablet by   mouth daily   metoprolol tartrate 25 MG tablet; Commonly known as: LOPRESSOR; Take 0.5   tablets by mouth 2 times daily   nitroGLYCERIN 0.4 MG SL tablet; Commonly known as: Nitrostat; Place 1   tablet under the tongue every 5 minutes as needed for Chest pain up to max   of 3 total doses. If no relief after 1 dose, call 911. CHANGE how you take these medications     atorvastatin 40 MG tablet; Commonly known as: LIPITOR; Take 1 tablet by   mouth nightly; What changed: medication strength, how much to take, when   to take this     CONTINUE taking these medications     acetaminophen 325 MG tablet; Commonly known as: TYLENOL   aspirin 81 MG tablet   D3 PO   lisinopril 20 MG tablet; Commonly known as: PRINIVIL;ZESTRIL   therapeutic multivitamin-minerals tablet          Coronary Discharge Core Measure: Please indicate the medication given by X, and if not the reasons not given:    Not Given Reason  Given      Beta Blockers x      ACE-I x      Statins x      ASA x       OAP (Plavix/Effient/Brilinta) Plavix     SL Nitro   x           Discussed with patient and nursing. Medications and discharge instructions reviewed with patient and nursing. Discussed in detail with patient post cath POC including but not limited to medications, diet, exercise, right groin artery site care, and follow-up. Questions and concerns addressed. OK for discharge home today. F/U in office in 1-3 weeks.      Electronically signed by JUANY Schmidt NP on 2/22/2023 at 12:10 PM  [facility]      [phone]

## 2023-02-22 NOTE — CARE COORDINATION
Case Management Assessment  Initial Evaluation    Date/Time of Evaluation: 2/22/2023 12:59 PM  Assessment Completed by: Tanya Chacon RN    If patient is discharged prior to next notation, then this note serves as note for discharge by case management. Patient Name: Angelo Solano                   YOB: 1935  Diagnosis: Chest pain, cardiac [R07.9]  S/P angioplasty with stent [Z95.820]  Status post cardiac catheterization [Z98.890]                   Date / Time: 2/21/2023  6:15 PM    Patient Admission Status: Inpatient   Readmission Risk (Low < 19, Mod (19-27), High > 27): Readmission Risk Score: 7.6    Current PCP: Sil Boston MD  PCP verified by CM? Yes    Chart Reviewed: Yes      History Provided by: Patient  Patient Orientation: Alert and Oriented    Patient Cognition: Alert    Hospitalization in the last 30 days (Readmission):  No    If yes, Readmission Assessment in CM Navigator will be completed. Advance Directives:      Code Status: Full Code   Patient's Primary Decision Maker is: Legal Next of Kin      Discharge Planning:    Patient lives with: (P) Spouse/Significant Other, Children Type of Home: (P) House  Primary Care Giver: Self  Patient Support Systems include: Spouse/Significant Other, Children   Current Financial resources: (P) Medicare  Current community resources:    Current services prior to admission: (P) None            Current DME:              Type of Home Care services:  (P) None    ADLS  Prior functional level: (P) Independent in ADLs/IADLs  Current functional level: (P) Independent in ADLs/IADLs    PT AM-PAC: 18 /24  OT AM-PAC:   /24    Family can provide assistance at DC: (P) Yes  Would you like Case Management to discuss the discharge plan with any other family members/significant others, and if so, who?     Plans to Return to Present Housing: (P) Yes  Other Identified Issues/Barriers to RETURNING to current housing: none  Potential Assistance needed at discharge: (P) N/A            Potential DME:    Patient expects to discharge to: (P) [facility] for transportation at discharge: (P) Family    Financial    Payor: [employer] Fees / Plan: MEDICARE PART A AND B / Product Type: *No Product type* /     Does insurance require precert for SNF: No    Potential assistance Purchasing Medications:    Meds-to-Beds request: Yes      [id number] - [address], [address] [facility] [address] [address]  Phone: [phone] Fax: [phone] [address], [address] [address]o Pantoja [facility] [address] [phone] [place] [phone] [address] [facility] [address]  Phone: [phone] Fax: [phone]      Notes:    Factors facilitating achievement of predicted outcomes: Family support, Cooperative, and Pleasant    Barriers to discharge: Medical complications    Additional Case Management Notes: patient returning home independently with his wife. He denies needs and has transportation home.  He does not want the walker    The Plan for Transition of Care is related to the following treatment goals of Chest pain, cardiac [R07.9]  S/P angioplasty with stent [B22.817]  Status post cardiac catheterization [Z98.890]    The Patient and/or Patient Representative Agree with the Discharge Plan?  yes    Zane Alexander RN  Case Management Department  Ph: [phone] Fax: [phone]

## 2023-02-22 NOTE — PROGRESS NOTES
CLINICAL PHARMACY NOTE: MEDS TO BEDS    Total # of Prescriptions Filled: 3   The following medications were delivered to the patient:  Metoprolol Tartrate 25mg  Atorvastatin 40mg  Nitro 0.4mg    Additional Documentation: delivered to patient in room 2002 2/22 at 1:54pm. Co-pay $13.92 check.

## 2023-03-01 ENCOUNTER — HOSPITAL ENCOUNTER (INPATIENT)
Age: 88
LOS: 1 days | Discharge: HOME HEALTH | End: 2023-03-02
Attending: STUDENT IN AN ORGANIZED HEALTH CARE EDUCATION/TRAINING PROGRAM | Admitting: STUDENT IN AN ORGANIZED HEALTH CARE EDUCATION/TRAINING PROGRAM
Payer: MEDICARE

## 2023-03-01 DIAGNOSIS — I10 ESSENTIAL (PRIMARY) HYPERTENSION: Primary | ICD-10-CM

## 2023-03-01 PROBLEM — R00.1 SYMPTOMATIC BRADYCARDIA: Status: ACTIVE | Noted: 2023-03-01

## 2023-03-01 PROCEDURE — 1200000000 HC SEMI PRIVATE

## 2023-03-02 VITALS
RESPIRATION RATE: 14 BRPM | OXYGEN SATURATION: 94 % | TEMPERATURE: 97.3 F | HEART RATE: 58 BPM | SYSTOLIC BLOOD PRESSURE: 151 MMHG | DIASTOLIC BLOOD PRESSURE: 57 MMHG

## 2023-03-02 PROBLEM — R97.20 ELEVATED PSA: Status: ACTIVE | Noted: 2017-04-26

## 2023-03-02 PROBLEM — R94.31 ELECTROCARDIOGRAM ABNORMAL: Status: ACTIVE | Noted: 2019-04-23

## 2023-03-02 PROBLEM — I35.1 NONRHEUMATIC AORTIC (VALVE) INSUFFICIENCY: Status: ACTIVE | Noted: 2017-10-27

## 2023-03-02 PROBLEM — E78.49 OTHER HYPERLIPIDEMIA: Status: ACTIVE | Noted: 2020-08-31

## 2023-03-02 PROBLEM — I10 ESSENTIAL (PRIMARY) HYPERTENSION: Status: ACTIVE | Noted: 2017-10-27

## 2023-03-02 LAB
TROPONIN I SERPL DL<=0.01 NG/ML-MCNC: 27 NG/L (ref 0–22)
TROPONIN I SERPL DL<=0.01 NG/ML-MCNC: 28 NG/L (ref 0–22)

## 2023-03-02 PROCEDURE — 84484 ASSAY OF TROPONIN QUANT: CPT

## 2023-03-02 PROCEDURE — 6370000000 HC RX 637 (ALT 250 FOR IP): Performed by: STUDENT IN AN ORGANIZED HEALTH CARE EDUCATION/TRAINING PROGRAM

## 2023-03-02 PROCEDURE — 6360000002 HC RX W HCPCS: Performed by: NURSE PRACTITIONER

## 2023-03-02 PROCEDURE — 93005 ELECTROCARDIOGRAM TRACING: CPT | Performed by: STUDENT IN AN ORGANIZED HEALTH CARE EDUCATION/TRAINING PROGRAM

## 2023-03-02 PROCEDURE — 36415 COLL VENOUS BLD VENIPUNCTURE: CPT

## 2023-03-02 PROCEDURE — 99222 1ST HOSP IP/OBS MODERATE 55: CPT | Performed by: STUDENT IN AN ORGANIZED HEALTH CARE EDUCATION/TRAINING PROGRAM

## 2023-03-02 PROCEDURE — 2580000003 HC RX 258: Performed by: NURSE PRACTITIONER

## 2023-03-02 RX ORDER — POTASSIUM CHLORIDE 7.45 MG/ML
10 INJECTION INTRAVENOUS PRN
Status: DISCONTINUED | OUTPATIENT
Start: 2023-03-02 | End: 2023-03-02 | Stop reason: HOSPADM

## 2023-03-02 RX ORDER — ONDANSETRON 4 MG/1
4 TABLET, ORALLY DISINTEGRATING ORAL EVERY 8 HOURS PRN
Status: DISCONTINUED | OUTPATIENT
Start: 2023-03-02 | End: 2023-03-02 | Stop reason: HOSPADM

## 2023-03-02 RX ORDER — AMLODIPINE BESYLATE 5 MG/1
5 TABLET ORAL DAILY
Qty: 30 TABLET | Refills: 3 | Status: SHIPPED | OUTPATIENT
Start: 2023-03-03 | End: 2023-03-02 | Stop reason: SDUPTHER

## 2023-03-02 RX ORDER — LISINOPRIL 5 MG/1
5 TABLET ORAL DAILY
Status: DISCONTINUED | OUTPATIENT
Start: 2023-03-02 | End: 2023-03-02 | Stop reason: HOSPADM

## 2023-03-02 RX ORDER — ASPIRIN 81 MG/1
81 TABLET, CHEWABLE ORAL DAILY
Status: DISCONTINUED | OUTPATIENT
Start: 2023-03-03 | End: 2023-03-02 | Stop reason: HOSPADM

## 2023-03-02 RX ORDER — SODIUM CHLORIDE 9 MG/ML
INJECTION, SOLUTION INTRAVENOUS PRN
Status: DISCONTINUED | OUTPATIENT
Start: 2023-03-02 | End: 2023-03-02 | Stop reason: HOSPADM

## 2023-03-02 RX ORDER — AMLODIPINE BESYLATE 5 MG/1
5 TABLET ORAL DAILY
Status: DISCONTINUED | OUTPATIENT
Start: 2023-03-02 | End: 2023-03-02 | Stop reason: HOSPADM

## 2023-03-02 RX ORDER — NITROGLYCERIN 0.4 MG/1
0.4 TABLET SUBLINGUAL EVERY 5 MIN PRN
Status: DISCONTINUED | OUTPATIENT
Start: 2023-03-02 | End: 2023-03-02 | Stop reason: HOSPADM

## 2023-03-02 RX ORDER — CLOPIDOGREL BISULFATE 75 MG/1
75 TABLET ORAL DAILY
Status: DISCONTINUED | OUTPATIENT
Start: 2023-03-02 | End: 2023-03-02 | Stop reason: HOSPADM

## 2023-03-02 RX ORDER — AMLODIPINE BESYLATE 5 MG/1
5 TABLET ORAL DAILY
Qty: 30 TABLET | Refills: 3 | Status: SHIPPED | OUTPATIENT
Start: 2023-03-03

## 2023-03-02 RX ORDER — ACETAMINOPHEN 325 MG/1
650 TABLET ORAL EVERY 6 HOURS PRN
Status: DISCONTINUED | OUTPATIENT
Start: 2023-03-02 | End: 2023-03-02 | Stop reason: HOSPADM

## 2023-03-02 RX ORDER — SODIUM CHLORIDE 0.9 % (FLUSH) 0.9 %
10 SYRINGE (ML) INJECTION PRN
Status: DISCONTINUED | OUTPATIENT
Start: 2023-03-02 | End: 2023-03-02 | Stop reason: HOSPADM

## 2023-03-02 RX ORDER — ONDANSETRON 2 MG/ML
4 INJECTION INTRAMUSCULAR; INTRAVENOUS EVERY 6 HOURS PRN
Status: DISCONTINUED | OUTPATIENT
Start: 2023-03-02 | End: 2023-03-02 | Stop reason: HOSPADM

## 2023-03-02 RX ORDER — POTASSIUM CHLORIDE 20 MEQ/1
40 TABLET, EXTENDED RELEASE ORAL PRN
Status: DISCONTINUED | OUTPATIENT
Start: 2023-03-02 | End: 2023-03-02 | Stop reason: HOSPADM

## 2023-03-02 RX ORDER — SODIUM CHLORIDE 9 MG/ML
INJECTION, SOLUTION INTRAVENOUS CONTINUOUS
Status: DISCONTINUED | OUTPATIENT
Start: 2023-03-02 | End: 2023-03-02 | Stop reason: HOSPADM

## 2023-03-02 RX ORDER — PANTOPRAZOLE SODIUM 40 MG/1
40 TABLET, DELAYED RELEASE ORAL
Status: DISCONTINUED | OUTPATIENT
Start: 2023-03-02 | End: 2023-03-02 | Stop reason: HOSPADM

## 2023-03-02 RX ORDER — ACETAMINOPHEN 650 MG/1
650 SUPPOSITORY RECTAL EVERY 6 HOURS PRN
Status: DISCONTINUED | OUTPATIENT
Start: 2023-03-02 | End: 2023-03-02 | Stop reason: HOSPADM

## 2023-03-02 RX ORDER — ATORVASTATIN CALCIUM 40 MG/1
40 TABLET, FILM COATED ORAL NIGHTLY
Status: DISCONTINUED | OUTPATIENT
Start: 2023-03-02 | End: 2023-03-02 | Stop reason: HOSPADM

## 2023-03-02 RX ORDER — SODIUM CHLORIDE 0.9 % (FLUSH) 0.9 %
5-40 SYRINGE (ML) INJECTION EVERY 12 HOURS SCHEDULED
Status: DISCONTINUED | OUTPATIENT
Start: 2023-03-02 | End: 2023-03-02 | Stop reason: HOSPADM

## 2023-03-02 RX ORDER — ENOXAPARIN SODIUM 100 MG/ML
40 INJECTION SUBCUTANEOUS DAILY
Status: DISCONTINUED | OUTPATIENT
Start: 2023-03-02 | End: 2023-03-02 | Stop reason: HOSPADM

## 2023-03-02 RX ORDER — MAGNESIUM SULFATE 1 G/100ML
1000 INJECTION INTRAVENOUS PRN
Status: DISCONTINUED | OUTPATIENT
Start: 2023-03-02 | End: 2023-03-02 | Stop reason: HOSPADM

## 2023-03-02 RX ADMIN — AMLODIPINE BESYLATE 5 MG: 5 TABLET ORAL at 11:47

## 2023-03-02 RX ADMIN — SODIUM CHLORIDE: 9 INJECTION, SOLUTION INTRAVENOUS at 00:57

## 2023-03-02 RX ADMIN — LISINOPRIL 5 MG: 5 TABLET ORAL at 10:58

## 2023-03-02 RX ADMIN — PANTOPRAZOLE SODIUM 40 MG: 40 TABLET, DELAYED RELEASE ORAL at 10:58

## 2023-03-02 RX ADMIN — CLOPIDOGREL 75 MG: 75 TABLET, FILM COATED ORAL at 10:58

## 2023-03-02 RX ADMIN — ENOXAPARIN SODIUM 40 MG: 100 INJECTION SUBCUTANEOUS at 08:18

## 2023-03-02 ASSESSMENT — ENCOUNTER SYMPTOMS
EYE PAIN: 0
APNEA: 0
COLOR CHANGE: 0
COUGH: 0
BLOOD IN STOOL: 0
EYE REDNESS: 0
ABDOMINAL DISTENTION: 0

## 2023-03-02 NOTE — CONSULTS
[facility]    Consult / H&P               Today's Date: 3/2/2023  Patient Name: Juan Sim  Date of admission: 3/1/2023 11:56 PM  Patient's age: 81 y.o., 1935  Admission Dx: Symptomatic bradycardia [R00.1]    Reason for Consult:  Cardiac evaluation    Requesting Physician: Katie Mcgee MD    CHIEF COMPLAINT: Bradycardia    History Obtained From:  patient, electronic medical record    HISTORY OF PRESENT ILLNESS:    This patient 81y.o. years old with past medical history given below. He was seen at the PCP office yesterday as regular check up, he was found to have HR in low 40's. He was subsequently transferred her for cardiology evaluation. He was recently discharge from our facility after getting stent to RCA. Was started on aspirin and Plavix and lopressor 12.5 bid. Patient denied any symptoms including chest pain, shortness of breath, lightheadedness or dizziness, orthopnea PND or leg swelling. Upon my evaluation patient is lying comfortably in the bed. Patient denied any symptoms. He is in normal sinus rhythm with heart rate in high 50s. Blood pressure is stable. Past Medical History:   has a past medical history of Aortic stenosis, Aortic valve insufficiency, Arthritis, BPH (benign prostatic hyperplasia), DDD (degenerative disc disease), cervical, Dyslipidemia, H/O: stroke, Heart palpitations, and HTN (hypertension). Past Surgical History:   has a past surgical history that includes Colonoscopy; back surgery; Aortic valve replacement (05/19/2011); hernia repair; Prostate surgery; Cataract removal with implant; joint replacement (Left, 07/29/2013); Cholecystectomy (2021); Cardiac catheterization (2011); transesophageal echocardiogram (02/08/2023); eye surgery; Cardiac catheterization (02/21/2023); and transesophageal echocardiogram (02/21/2023). Home Medications:    Prior to Admission medications    Medication Sig Start Date End Date Taking?  Authorizing Provider   atorvastatin (LIPITOR) 40 MG tablet Take 1 tablet by mouth nightly 2/22/23   JUANY Gardiner NP   nitroGLYCERIN (NITROSTAT) 0.4 MG SL tablet Place 1 tablet under the tongue every 5 minutes as needed for Chest pain up to max of 3 total doses. If no relief after 1 dose, call 911. 2/22/23   JUANY Song NP   metoprolol tartrate (LOPRESSOR) 25 MG tablet Take 0.5 tablets by mouth 2 times daily 2/22/23   JUANY Gardiner NP   clopidogrel (PLAVIX) 75 MG tablet Take 1 tablet by mouth daily 2/21/23   Michelle Patel MD   lisinopril (PRINIVIL;ZESTRIL) 20 MG tablet Take 20 mg by mouth in the morning and at bedtime    Historical Provider, MD   Cholecalciferol (D3 PO) Take 1 capsule by mouth daily    Historical Provider, MD   Multiple Vitamins-Minerals (THERAPEUTIC MULTIVITAMIN-MINERALS) tablet Take 1 tablet by mouth daily    Historical Provider, MD   aspirin 81 MG tablet Take 81 mg by mouth every evening    Historical Provider, MD   acetaminophen (TYLENOL) 325 MG tablet Take 650 mg by mouth every 6 hours as needed. Historical Provider, MD       Allergies:  Codeine, Penicillins, and Sulfa antibiotics    Social History:   reports that he has never smoked. He does not have any smokeless tobacco history on file. He reports that he does not currently use alcohol. He reports that he does not use drugs. Family History: family history includes Cancer in his sister; Diabetes in his sister; Heart Attack in his father; Hypertension in his mother. REVIEW OF SYSTEMS:    Constitutional: there has been no unanticipated weight loss. There's been No change in energy level, No change in activity level. Eyes: No visual changes or diplopia. No scleral icterus. ENT: No Headaches  Cardiovascular: as per HPI  Respiratory: as per HPI  Gastrointestinal: No abdominal pain. No change in bowel or bladder habits. Genitourinary: No dysuria, trouble voiding, or hematuria.   Musculoskeletal:  No gait disturbance, No weakness or joint complaints. Integumentary: No rash or pruritis. Neurological: No headache, diplopia, change in muscle strength, numbness or tingling. No change in gait, balance, coordination, mood, affect, memory, mentation, behavior. Endocrine: No temperature intolerance. No excessive thirst, fluid intake, or urination. No tremor. Hematologic/Lymphatic: No abnormal bruising or bleeding, blood clots or swollen lymph nodes. Allergic/Immunologic: No nasal congestion or hives. PHYSICAL EXAM:      BP (!) 147/63   Pulse 58   Temp 97.6 °F (36.4 °C) (Oral)   Resp 13   SpO2 93%    Constitutional and General Appearance: alert, cooperative, no distress and appears stated age  HEENT: PERRL, no cervical lymphadenopathy. No masses palpable. Normal oral mucosa  Respiratory:  Resp Auscultation: Good respiratory effort. No for increased work of breathing. On auscultation: clear to auscultation bilaterally  Cardiovascular: The apical impulse is not displaced  regular S1 and S2.  Jugular venous pulsation Normal  Peripheral pulses are symmetrical and full   Abdomen:   No masses or tenderness  Bowel sounds present  Extremities:   No Cyanosis or Clubbing   Lower extremity edema: No   Skin: Warm and dry  Neurological:  Deferred     BEBE: 2/21/2022  A BEBE was performed without complications. The study was performed by the attending, fellow, and the sonographer. LVEF 55%. he aortic valve is tri-leaflet and sclerotic with aortic sclerosis. BERNARD by  planimetry 0.94 cm2. Mild AI. Moderate to severe MR. No thrombus or valvular vegetation identified,. Cardiac Angiography: 2/21/2022  LMCA: Normal     LAD: Mid 80% stenosis. The vessel is small distally   LIMA was checked and appeared not going anywhere     LCx: Large with no disease     RCA: Mid 80% stenosis       Lesion on Mid RCA:         Devices used         - Luge Wire 182 cm. Number of passes: 1.         - [place] 3.5 x 12mm.  1 inflation(s) to a max pressure of: 12     desiree. - Geremias Desha 3.5 x 12mm. 1 inflation(s) to a max pressure of: 12     desiree. Coronary Tree        Dominance: Left       LV Analysis   LV function assessed as:Normal.   Ejection Fraction   +----------------------------------------------------------------------+---+   ! Method                                                                ! EF%! +----------------------------------------------------------------------+---+   ! LV gram                                                               !50 !   +----------------------------------------------------------------------+---+      Impression:  Mid RCA stenosis 85%   Successful PTCA -CORNELIUS mid RCA 9Because of abnormal stress test inferior apical area)   Mid LAD stenosis, small size vessel (To be treated if continue angina or Sx)   Elevated right heart pressure   Aortic valve function good with mean gradient 11 mm Hg   BEBE confirmed moderate to severe mitral regurgitation, moderate aortic insufficiency, with LV function mildly reduced. Recommendations:    Post stent protocol of the RCA   Monitor LAD disease with stress test and angina symptoms . IMPRESSION:    Sinus bradycardia likely due to Lopressor. Resolved. CAD s/p PTCA-CORNELIUS to RCA on 2/21. Mid LAD stenosis, small size vessel (To be treated if continue angina or Sx)   Minimally elevated troponin. Hypertension  History of CVA  History of AVR    RECOMMENDATIONS:  Bradycardia was likely due to Lopressor. Patient currently is in normal sinus rhythm with heart rate in high 50s. Denied any symptoms. Hold all AV andria blocking agent. Discontinue Lopressor upon discharge  No indication for pacemaker at this time. Continue rest of the medication including aspirin, Plavix, lisinopril and Lipitor. No further cardiac work-up.       Final recommendation after discussion with rounding attending       Tommie Golden MD. PGY- 4  Cardiology Fellow  NATHAN MANN, [place], [place]          Attending Physician Statement:    I have discussed the care of  Fredo Pat , including pertinent history and exam findings, with the Cardiology fellow/resident. I have seen and examined the patient and the key elements of all parts of the encounter have been performed by me. I agree with the assessment, plan and orders as documented by the fellow/resident, after I modified exam findings and plan of treatments, and the final version is my approved version of the assessment. Additional Comments: Bradycardia due to metoprolol. DC metoprolol. Add norvasc 5mg po qday. Ok to discharge from cardiology perspective.     Yissel Boston MD

## 2023-03-02 NOTE — PROGRESS NOTES
CLINICAL PHARMACY NOTE: MEDS TO BEDS    Total # of Prescriptions Filled: 1   The following medications were delivered to the patient:  amlodipine    Additional Documentation:

## 2023-03-02 NOTE — DISCHARGE INSTRUCTIONS
Monitor blood pressure and heart rate  Follow up with cardiology and PCP  DO NOT take metoprolol unless directed by PCP or cardiologist

## 2023-03-02 NOTE — CARE COORDINATION
Case Management Assessment  Initial Evaluation    Date/Time of Evaluation: 3/2/2023 11:47 AM  Assessment Completed by: Shahla Montoya RN    If patient is discharged prior to next notation, then this note serves as note for discharge by case management. Patient Name: Edwar Ruggiero                   YOB: 1935  Diagnosis: Symptomatic bradycardia [R00.1]                   Date / Time: 3/1/2023 11:56 PM    Patient Admission Status: Inpatient   Readmission Risk (Low < 19, Mod (19-27), High > 27): Readmission Risk Score: 9.9    Current PCP: Chela Saldana MD  PCP verified by CM? Yes    Chart Reviewed: Yes      History Provided by: Patient  Patient Orientation: Alert and Oriented, Person, Place, Situation    Patient Cognition: Alert    Hospitalization in the last 30 days (Readmission):  No    If yes, Readmission Assessment in  Navigator will be completed. Advance Directives:      Code Status: Full Code   Patient's Primary Decision Maker is: Legal Next of Kin      Discharge Planning:    Patient lives with: Spouse/Significant Other Type of Home: House  Primary Care Giver: Self  Patient Support Systems include: Spouse/Significant Other, Children, Family Members   Current Financial resources: Medicare  Current community resources:    Current services prior to admission: Durable Medical Equipment            Current DME: Cane            Type of Home Care services:  None    ADLS  Prior functional level: Independent in ADLs/IADLs  Current functional level: Independent in ADLs/IADLs    PT AM-PAC:   /24  OT AM-PAC:   /24    Family can provide assistance at DC: Yes  Would you like Case Management to discuss the discharge plan with any other family members/significant others, and if so, who?  No  Plans to Return to Present Housing: Yes  Other Identified Issues/Barriers to RETURNING to current housing: na  Potential Assistance needed at discharge: N/A            Potential DME:    Patient expects to discharge to: [facility] for transportation at discharge: Family    Financial    Payor: MEDICARE / Plan: MEDICARE PART A AND B / Product Type: *No Product type* /     Does insurance require precert for SNF: No    Potential assistance Purchasing Medications: No  Meds-to-Beds request: Yes      [facility] #[id number] Melodie Chaves, [address] [place] [address]. [address] [facility] [address]  Phone: [phone] Fax: [phone] [facility], [address] [place] [address] [place] [facility] [address] [phone] Radha Oneill [phone] [facility] [address]  Phone: [phone] Fax: [phone]      Notes:    Factors facilitating achievement of predicted outcomes: Family support, Motivated, Cooperative, Pleasant, Sense of humor, and Good insight into deficits    Barriers to discharge: na    Additional Case Management Notes: met with patient to discuss transitional planning. Plan is home with spouse. Patient has ride home. Patient agreeable to plan     The Plan for Transition of Care is related to the following treatment goals of Symptomatic bradycardia [U30.1]    IF APPLICABLE: The Patient and/or patient representative Mariela Hernandez and his family were provided with a choice of provider and agrees with the discharge plan. Freedom of choice list with basic dialogue that supports the patient's individualized plan of care/goals and shares the quality data associated with the providers was provided to: Patient   Patient Representative Name:       The Patient and/or Patient Representative Agree with the Discharge Plan?  Yes    Hudson Silva RN  Case Management Department  Ph: [phone] Fax: na

## 2023-03-02 NOTE — H&P
[facility]  Office: [address], DO, Nederlandedmond Ball, DO, Felicia Sari, DO, Selene Mena Blood, DO, Husam Campbell MD, Michela Wong MD, Kerri Avery MD, Luis Enrique Valdez MD,  Chastity Oliver MD, Ashu Coy MD, Reba Trotter DO, Tan Lucas MD,  Ken Castrejon MD, Clif Lowe MD, Harry Jamison DO, Case Almaraz MD, Jose Armando Elkins MD, Humera Choudhary DO, Tasha Goetz MD, Cesar Blankenship MD, Lindsey Baum MD, Victor Hugo Marks MD, Kris Rose DO, Ace Miller MD, Kirsten Hess MD, Dylan Valverde, CNP,  Savannah Crockett, CNP, Shahbaz Carrillo, CNP, Kishore Fuentes, CNP,  Catina Lehman, [facility], Josiah Cheung, CNP, Ortiz Bhagat, CNP, Avis Duane, CNP, Joslyn Walls, CNP, James Cervantes, [facility], SISI MinaC, Arielle Orr, CNS, Brian Francois, CNP, Mayito Coffman, CNP         [address]    HISTORY AND PHYSICAL EXAMINATION            Date:   3/2/2023  Patient name:  Joelle Heimlich  Date of admission:  3/1/2023 11:56 PM  MRN:   [id number]  Account:  [de-identified]  YOB: 1935  PCP:    Jim Lopez MD  Room:   59/4678-28  Code Status:    Full Code    Chief Complaint:     fatigue    History Obtained From:     patient, family member, electronic medical record    History of Present Illness:     Joelle Heimlich is a 81 y.o. Non- / non  male who presents with No chief complaint on file. and is admitted to the hospital for the management of Symptomatic bradycardia. This is a pleasant 81year old male with past medical history of HTN, CAD status post CORNELIUS on 2/21/23, CVA, BPH, mitral regurtitation, AVR presents with fatigue found to have bradycardia at outlying ER, family and PCP requesting transfer to [facility].     [facility] 2/21/23:  Impression:  Mid RCA stenosis 85%   Successful PTCA -CORNELIUS mid RCA 9Because of abnormal stress test inferior apical area)   Mid LAD stenosis, small size vessel (To be treated if continue angina or Sx)   Elevated right heart pressure   Aortic valve function good with mean gradient 11 mm Hg   BEBE confirmed moderate to severe mitral regurgitation, moderate aortic insufficiency, with LV function mildly reduced. Recommendations:    Post stent protocol of the RCA   Monitor LAD disease with stress test and angina symptoms       Hemodynamic Pressures                Site S/A (mmHg) D/V (mmHg) M/ED (mmHg)   Right Atrium 9 8 6   Right Ventricle 64 0 30   Pulmonary Artery 43 0 19   PCWP 18 17 11            Oxygen Saturations     Site Oxygen Saturation (%)   Right Atrium 67.6   Pulmonary Artery Main 92.2         Cardiac Output     Calculation Method Cardiac Output (L/min) Cardiac Index (L/min/m2)   Lokesh 3.88 2.1        BEBE 2/21/23:  Summary:      1. A BEBE was performed without complications. 2. LVEF 55%. 3. The aortic valve is tri-leaflet and sclerotic with aortic sclerosis. BERNARD by planimetry 0.94 cm2. Mild AI. 4. Moderate to severe MR.  5. No thrombus or valvular vegetation identified  6. There were no complications encountered. 7. Proceed with right and left sided cardiac catheterization.      Past Medical History:     Past Medical History:   Diagnosis Date    Aortic stenosis     Aortic valve insufficiency 05/09/2011    aortic valve replacement    Arthritis     BPH (benign prostatic hyperplasia)     DDD (degenerative disc disease), cervical     Dyslipidemia     H/O: stroke 2011    Heart palpitations     HTN (hypertension)         Past Surgical History:     Past Surgical History:   Procedure Laterality Date    AORTIC VALVE REPLACEMENT  05/19/2011    minimally invasive 27mm medtonic mosaic  /  ROBOTIC    BACK SURGERY      [facility]  02/21/2023    CATARACT REMOVAL WITH IMPLANT      2008 AND 2011    CHOLECYSTECTOMY  2021    [address] / 1996 / 1999 JOINT REPLACEMENT Left 07/29/2013    PROSTATE SURGERY      TRANSESOPHAGEAL ECHOCARDIOGRAM  02/08/2023    TRANSESOPHAGEAL ECHOCARDIOGRAM  02/21/2023        Medications Prior to Admission:     Prior to Admission medications    Medication Sig Start Date End Date Taking? Authorizing Provider   atorvastatin (LIPITOR) 40 MG tablet Take 1 tablet by mouth nightly 2/22/23   JUANY Gardiner NP   nitroGLYCERIN (NITROSTAT) 0.4 MG SL tablet Place 1 tablet under the tongue every 5 minutes as needed for Chest pain up to max of 3 total doses. If no relief after 1 dose, call 911. 2/22/23   JUANY Lara NP   metoprolol tartrate (LOPRESSOR) 25 MG tablet Take 0.5 tablets by mouth 2 times daily 2/22/23   JUANY Gardiner NP   clopidogrel (PLAVIX) 75 MG tablet Take 1 tablet by mouth daily 2/21/23   Joesphine Brittle, MD   lisinopril (PRINIVIL;ZESTRIL) 20 MG tablet Take 20 mg by mouth in the morning and at bedtime    Historical Provider, MD   Cholecalciferol (D3 PO) Take 1 capsule by mouth daily    Historical Provider, MD   Multiple Vitamins-Minerals (THERAPEUTIC MULTIVITAMIN-MINERALS) tablet Take 1 tablet by mouth daily    Historical Provider, MD   aspirin 81 MG tablet Take 81 mg by mouth every evening    Historical Provider, MD   acetaminophen (TYLENOL) 325 MG tablet Take 650 mg by mouth every 6 hours as needed. Historical Provider, MD        Allergies:     Codeine, Penicillins, and Sulfa antibiotics    Social History:     Tobacco:    reports that he has never smoked. He does not have any smokeless tobacco history on file. Alcohol:      reports that he does not currently use alcohol. Drug Use:  reports no history of drug use. Family History:     Family History   Problem Relation Age of Onset    Heart Attack Father     Cancer Sister         breast    Hypertension Mother     Diabetes Sister        Review of Systems:     Positive and Negative as described in HPI.     Review of Systems   Constitutional: Positive for fatigue. Negative for activity change and fever. HENT:  Negative for congestion and mouth sores. Eyes:  Negative for pain and redness. Respiratory:  Negative for apnea and cough. Cardiovascular:  Negative for chest pain and leg swelling. Gastrointestinal:  Negative for abdominal distention and blood in stool. Genitourinary:  Negative for difficulty urinating and dysuria. Musculoskeletal:  Negative for arthralgias, gait problem and joint swelling. Skin:  Negative for color change and pallor. Neurological:  Positive for weakness. Negative for dizziness and tremors. Psychiatric/Behavioral:  Negative for agitation and confusion. Physical Exam:   BP (!) 147/63   Pulse 58   Temp 97.6 °F (36.4 °C) (Oral)   Resp 13   SpO2 93%   Temp (24hrs), Av °F (36.7 °C), Min:97.6 °F (36.4 °C), Max:98.2 °F (36.8 °C)    No results for input(s): POCGLU in the last 72 hours. No intake or output data in the 24 hours ending 23 0835    Physical Exam  Constitutional:       Appearance: He is not ill-appearing or diaphoretic. HENT:      Head: Normocephalic. Right Ear: External ear normal.      Left Ear: External ear normal.      Nose: Nose normal.      Mouth/Throat:      Mouth: Mucous membranes are moist.   Eyes:      Pupils: Pupils are equal, round, and reactive to light. Cardiovascular:      Rate and Rhythm: Bradycardia present. Rhythm irregular. Heart sounds: Murmur heard. Comments: Systolic murmur, slightly delayed pulses in lower extremities  Pulmonary:      Effort: No respiratory distress. Breath sounds: No wheezing. Abdominal:      Palpations: Abdomen is soft. Tenderness: There is no abdominal tenderness. There is no guarding. Musculoskeletal:      Cervical back: Neck supple. Right lower leg: No edema. Left lower leg: No edema. Skin:     General: Skin is warm and dry. Capillary Refill: Capillary refill takes less than 2 seconds. Neurological:      General: No focal deficit present. Mental Status: He is alert and oriented to person, place, and time. Psychiatric:         Mood and Affect: Mood normal.         Behavior: Behavior normal.       Investigations:      Laboratory Testing:  Recent Results (from the past 24 hour(s))   Troponin    Collection Time: 03/02/23 12:56 AM   Result Value Ref Range    Troponin, High Sensitivity 27 (H) 0 - 22 ng/L   Troponin    Collection Time: 03/02/23  4:01 AM   Result Value Ref Range    Troponin, High Sensitivity 28 (H) 0 - 22 ng/L   EKG 12 Lead    Collection Time: 03/02/23  7:24 AM   Result Value Ref Range    Ventricular Rate 56 BPM    Atrial Rate 56 BPM    P-R Interval 174 ms    QRS Duration 90 ms    Q-T Interval 438 ms    QTc Calculation (Bazett) 422 ms    P Axis 74 degrees    R Axis 7 degrees    T Axis 72 degrees       Imaging/Diagnostics:  No results found. Assessment :      Hospital Problems             Last Modified POA    * (Principal) Symptomatic bradycardia 3/1/2023 Yes    Status post cardiac catheterization 3/2/2023 Yes    S/P angioplasty with stent 3/2/2023 Yes    Atherosclerotic heart disease of native coronary artery without angina pectoris 3/2/2023 Yes    Electrocardiogram abnormal 3/2/2023 Yes    Elevated PSA 3/2/2023 Yes    Essential (primary) hypertension 3/2/2023 Yes    Other hyperlipidemia 3/2/2023 Yes    S/P aortic valve replacement with bioprosthetic valve 3/2/2023 Yes       Plan:     Patient status inpatient in the Progressive Unit/Step down    Bradycardia with PAC noted on telemetry, appreicate cardiology recommendations. Currently NPO, repeat EKG, troponins stable. CAD status post CORNELIUS to RCA 2/21/23. ASA, liptiro, plavix  Follow up morning labs  HTN.  Lisinopril 5 mg  PT/OT    Consultations:   Manolo Camilo MD  3/2/2023  8:35 AM    Copy sent to Dr. Sondra Garcia MD

## 2023-03-03 LAB
EKG ATRIAL RATE: 56 BPM
EKG P AXIS: 74 DEGREES
EKG P-R INTERVAL: 174 MS
EKG Q-T INTERVAL: 438 MS
EKG QRS DURATION: 90 MS
EKG QTC CALCULATION (BAZETT): 422 MS
EKG R AXIS: 7 DEGREES
EKG T AXIS: 72 DEGREES
EKG VENTRICULAR RATE: 56 BPM

## 2023-03-03 PROCEDURE — 93010 ELECTROCARDIOGRAM REPORT: CPT | Performed by: INTERNAL MEDICINE

## 2023-03-03 NOTE — DISCHARGE SUMMARY
[facility]  Office: [address], DO, Jake Carrillo, DO, Edwin Holbrook, DO, Sharon Lemus Blood, DO, Howie Escalante MD, Mariana Biggs MD, Forrest Mcfarland MD, Suellyn Snellen, MD,  Sandy Yung MD, Jazmine Carpenter MD, Demarcus Mejia, DO, Jes Mcnally MD,  Donald Silveira, DO, Avani High MD, Aundrea Heredia MD, Kt Rogers DO, Nasrin Morel MD, Hector Rizo MD, Diana Joseph, DO, Tara Lopez MD, Ludwin Loomis MD, Silvia Castaneda MD, Leyla Salinas MD, Jacobo Arriaga, DO, Ye Rivas MD, Alexander Lemos MD, Kalyan Bob, CNP,  Giorgi Smart, CNP, Lizette James, CNP, Mario Buchanan, CNP,  David Caicedo, [facility], Siva Nascimento, CNP, Michel Briceño, CNP, Zuleika Rodrigues, CNP, Jaquelin Morrison, CNP, Houston Correia, CNP, Néstor Ayers, [employer], Gilberto Bee, CNS, Samson Brown, CNP, Kiana Almonte, [facility]    Discharge Summary     Patient ID: Jose L Cornejo  :  1935   MRN: [id number]     ACCOUNT:  [de-identified]   Patient's PCP: Ruslan Geller MD  Admit Date: 3/1/2023   Discharge Date: 3/3/2023     Length of Stay: 1  Code Status:  Prior  Admitting Physician: Jes Mcnally MD  Discharge Physician: Jes Mcnally MD     Active Discharge Diagnoses:     Hospital Problem Lists:  Principal Problem:    Symptomatic bradycardia  Active Problems:    Status post cardiac catheterization    S/P angioplasty with stent    Atherosclerotic heart disease of native coronary artery without angina pectoris    Electrocardiogram abnormal    Elevated PSA    Essential (primary) hypertension    Other hyperlipidemia    S/P aortic valve replacement with bioprosthetic valve  Resolved Problems:    * No resolved hospital problems.  *      Admission Condition:  stable     Discharged Condition: stable    Hospital Stay:     Hospital Course:  Jose L Cornejo is a 81 y.o. male who was admitted for the management of Symptomatic bradycardia , presented to ER with No chief complaint on file. This is a pleasant 81year old male with past medical history of HTN, CAD status post CORNELIUS on 2/21/23, CVA, BPH, mitral regurtitation, AVR presents with fatigue found to have bradycardia at outlying ER, family and PCP requesting transfer to [facility]. Patient was evaluated by cardiology lopressor stopped and Norvasc added. Patient discharged home to follow up with PCP and cardiology. Significant therapeutic interventions: N/A    Significant Diagnostic Studies:   Labs / Micro:  CBC:   Lab Results   Component Value Date/Time    WBC 9.7 02/22/2023 06:42 AM    RBC 4.03 02/22/2023 06:42 AM    HGB 13.6 02/22/2023 06:42 AM    HCT 41.1 02/22/2023 06:42 AM    .0 02/22/2023 06:42 AM    MCH 33.7 02/22/2023 06:42 AM    MCHC 33.1 02/22/2023 06:42 AM    RDW 13.1 02/22/2023 06:42 AM     02/22/2023 06:42 AM     BMP:    Lab Results   Component Value Date/Time    GLUCOSE 116 02/22/2023 06:42 AM     02/22/2023 06:42 AM    K 5.4 02/22/2023 06:42 AM     02/22/2023 06:42 AM    CO2 22 02/22/2023 06:42 AM    ANIONGAP 11 02/22/2023 06:42 AM    BUN 19 02/22/2023 06:42 AM    CREATININE 1.18 02/22/2023 06:42 AM    CALCIUM 9.0 02/22/2023 06:42 AM    LABGLOM >60 02/22/2023 06:42 AM        Radiology:  No results found. Consultations:    Consults:     Final Specialist Recommendations/Findings:   IP CONSULT TO CARDIOLOGY      The patient was seen and examined on day of discharge and this discharge summary is in conjunction with any daily progress note from day of discharge. Discharge plan:     Disposition: Home    Physician Follow Up: Follow up Cardiology and PCP  No follow-up provider specified. Requiring Further Evaluation/Follow Up POST HOSPITALIZATION/Incidental Findings:  Follow up PCP and Cardiology, Hold lopressor due to bradycardia     Diet: regular diet    Activity: As tolerated    Instructions to Patient: Follow up PCP and Cardiology, Hold lopressor due to bradycardia    Discharge Medications:      Medication List        START taking these medications      amLODIPine 5 MG tablet  Commonly known as: NORVASC  Take 1 tablet by mouth daily            CONTINUE taking these medications      acetaminophen 325 MG tablet  Commonly known as: TYLENOL     aspirin 81 MG tablet     atorvastatin 40 MG tablet  Commonly known as: LIPITOR  Take 1 tablet by mouth nightly     clopidogrel 75 MG tablet  Commonly known as: Plavix  Take 1 tablet by mouth daily     D3 PO     lisinopril 20 MG tablet  Commonly known as: PRINIVIL;ZESTRIL     nitroGLYCERIN 0.4 MG SL tablet  Commonly known as: Nitrostat  Place 1 tablet under the tongue every 5 minutes as needed for Chest pain up to max of 3 total doses. If no relief after 1 dose, call 911. therapeutic multivitamin-minerals tablet            STOP taking these medications      metoprolol tartrate 25 MG tablet  Commonly known as: LOPRESSOR               Where to Get Your Medications        These medications [place] [facility] [address] [facility], [address] [place] [address], [address] [facility] [address]      Phone: [phone]   amLODIPine 5 MG tablet         Discharge Procedure Orders   Basic Metabolic Panel   Standing Status: Future Standing Exp. Date: 03/02/24       Time Spent on discharge is  20 mins in patient examination, evaluation, counseling as well as medication reconciliation, prescriptions for required medications, discharge plan and follow up. Electronically signed by   Gorge Kamara MD  3/3/2023  9:15 AM      Thank you Dr. Jaime Carrizales MD for the opportunity to be involved in this patient's care.